# Patient Record
Sex: FEMALE | Race: WHITE | NOT HISPANIC OR LATINO | Employment: PART TIME | URBAN - METROPOLITAN AREA
[De-identification: names, ages, dates, MRNs, and addresses within clinical notes are randomized per-mention and may not be internally consistent; named-entity substitution may affect disease eponyms.]

---

## 2017-06-30 ENCOUNTER — COMMUNICATION - HEALTHEAST (OUTPATIENT)
Dept: INTERNAL MEDICINE | Facility: CLINIC | Age: 57
End: 2017-06-30

## 2017-06-30 DIAGNOSIS — I10 HTN (HYPERTENSION): ICD-10-CM

## 2017-09-12 ENCOUNTER — COMMUNICATION - HEALTHEAST (OUTPATIENT)
Dept: INTERNAL MEDICINE | Facility: CLINIC | Age: 57
End: 2017-09-12

## 2017-09-12 DIAGNOSIS — I10 HTN (HYPERTENSION): ICD-10-CM

## 2017-09-29 ENCOUNTER — RECORDS - HEALTHEAST (OUTPATIENT)
Dept: ADMINISTRATIVE | Facility: OTHER | Age: 57
End: 2017-09-29

## 2017-11-12 ENCOUNTER — COMMUNICATION - HEALTHEAST (OUTPATIENT)
Dept: INTERNAL MEDICINE | Facility: CLINIC | Age: 57
End: 2017-11-12

## 2017-11-12 ENCOUNTER — COMMUNICATION - HEALTHEAST (OUTPATIENT)
Dept: FAMILY MEDICINE | Facility: CLINIC | Age: 57
End: 2017-11-12

## 2017-11-12 DIAGNOSIS — I10 HTN (HYPERTENSION): ICD-10-CM

## 2018-01-28 ENCOUNTER — COMMUNICATION - HEALTHEAST (OUTPATIENT)
Dept: INTERNAL MEDICINE | Facility: CLINIC | Age: 58
End: 2018-01-28

## 2018-01-28 DIAGNOSIS — I10 HTN (HYPERTENSION): ICD-10-CM

## 2018-01-30 ENCOUNTER — OFFICE VISIT - HEALTHEAST (OUTPATIENT)
Dept: INTERNAL MEDICINE | Facility: CLINIC | Age: 58
End: 2018-01-30

## 2018-01-30 DIAGNOSIS — E78.00 ELEVATED CHOLESTEROL: ICD-10-CM

## 2018-01-30 DIAGNOSIS — I10 HTN (HYPERTENSION): ICD-10-CM

## 2018-01-30 LAB
ALBUMIN SERPL-MCNC: 4.1 G/DL (ref 3.5–5)
ALP SERPL-CCNC: 56 U/L (ref 45–120)
ALT SERPL W P-5'-P-CCNC: 34 U/L (ref 0–45)
ANION GAP SERPL CALCULATED.3IONS-SCNC: 11 MMOL/L (ref 5–18)
AST SERPL W P-5'-P-CCNC: 26 U/L (ref 0–40)
BILIRUB SERPL-MCNC: 0.6 MG/DL (ref 0–1)
BUN SERPL-MCNC: 20 MG/DL (ref 8–22)
CALCIUM SERPL-MCNC: 9.9 MG/DL (ref 8.5–10.5)
CHLORIDE BLD-SCNC: 102 MMOL/L (ref 98–107)
CHOLEST SERPL-MCNC: 286 MG/DL
CO2 SERPL-SCNC: 26 MMOL/L (ref 22–31)
CREAT SERPL-MCNC: 0.81 MG/DL (ref 0.6–1.1)
FASTING STATUS PATIENT QL REPORTED: ABNORMAL
GFR SERPL CREATININE-BSD FRML MDRD: >60 ML/MIN/1.73M2
GLUCOSE BLD-MCNC: 104 MG/DL (ref 70–125)
HDLC SERPL-MCNC: 47 MG/DL
LDLC SERPL CALC-MCNC: 162 MG/DL
POTASSIUM BLD-SCNC: 3.8 MMOL/L (ref 3.5–5)
PROT SERPL-MCNC: 7.3 G/DL (ref 6–8)
SODIUM SERPL-SCNC: 139 MMOL/L (ref 136–145)
TRIGL SERPL-MCNC: 387 MG/DL
TSH SERPL DL<=0.005 MIU/L-ACNC: 1.13 UIU/ML (ref 0.3–5)

## 2018-06-11 ENCOUNTER — COMMUNICATION - HEALTHEAST (OUTPATIENT)
Dept: INTERNAL MEDICINE | Facility: CLINIC | Age: 58
End: 2018-06-11

## 2018-06-18 ENCOUNTER — OFFICE VISIT - HEALTHEAST (OUTPATIENT)
Dept: INTERNAL MEDICINE | Facility: CLINIC | Age: 58
End: 2018-06-18

## 2018-06-18 DIAGNOSIS — G47.00 INSOMNIA: ICD-10-CM

## 2018-06-18 DIAGNOSIS — F32.A DEPRESSION: ICD-10-CM

## 2018-06-18 DIAGNOSIS — Z12.31 VISIT FOR SCREENING MAMMOGRAM: ICD-10-CM

## 2018-06-25 ENCOUNTER — OFFICE VISIT - HEALTHEAST (OUTPATIENT)
Dept: PSYCHOLOGY | Facility: CLINIC | Age: 58
End: 2018-06-25

## 2018-06-25 DIAGNOSIS — F43.22 ADJUSTMENT DISORDER WITH ANXIOUS MOOD: ICD-10-CM

## 2018-07-05 ENCOUNTER — COMMUNICATION - HEALTHEAST (OUTPATIENT)
Dept: ADMINISTRATIVE | Facility: CLINIC | Age: 58
End: 2018-07-05

## 2018-07-05 ENCOUNTER — COMMUNICATION - HEALTHEAST (OUTPATIENT)
Dept: INTERNAL MEDICINE | Facility: CLINIC | Age: 58
End: 2018-07-05

## 2018-11-07 ENCOUNTER — RECORDS - HEALTHEAST (OUTPATIENT)
Dept: ADMINISTRATIVE | Facility: OTHER | Age: 58
End: 2018-11-07

## 2019-01-12 ENCOUNTER — COMMUNICATION - HEALTHEAST (OUTPATIENT)
Dept: INTERNAL MEDICINE | Facility: CLINIC | Age: 59
End: 2019-01-12

## 2019-01-12 DIAGNOSIS — I10 HTN (HYPERTENSION): ICD-10-CM

## 2019-01-13 ENCOUNTER — COMMUNICATION - HEALTHEAST (OUTPATIENT)
Dept: INTERNAL MEDICINE | Facility: CLINIC | Age: 59
End: 2019-01-13

## 2019-01-13 DIAGNOSIS — I10 HTN (HYPERTENSION): ICD-10-CM

## 2019-03-19 ENCOUNTER — COMMUNICATION - HEALTHEAST (OUTPATIENT)
Dept: INTERNAL MEDICINE | Facility: CLINIC | Age: 59
End: 2019-03-19

## 2019-03-19 DIAGNOSIS — I10 HTN (HYPERTENSION): ICD-10-CM

## 2019-03-20 ENCOUNTER — HOSPITAL ENCOUNTER (OUTPATIENT)
Dept: MAMMOGRAPHY | Facility: CLINIC | Age: 59
Discharge: HOME | End: 2019-03-20
Attending: INTERNAL MEDICINE

## 2019-03-20 DIAGNOSIS — Z12.31 VISIT FOR SCREENING MAMMOGRAM: ICD-10-CM

## 2019-04-23 ENCOUNTER — COMMUNICATION - HEALTHEAST (OUTPATIENT)
Dept: FAMILY MEDICINE | Facility: CLINIC | Age: 59
End: 2019-04-23

## 2019-04-23 ENCOUNTER — OFFICE VISIT - HEALTHEAST (OUTPATIENT)
Dept: FAMILY MEDICINE | Facility: CLINIC | Age: 59
End: 2019-04-23

## 2019-04-23 DIAGNOSIS — I10 ESSENTIAL HYPERTENSION: ICD-10-CM

## 2019-04-23 DIAGNOSIS — Z12.4 SCREENING FOR CERVICAL CANCER: ICD-10-CM

## 2019-04-23 DIAGNOSIS — E78.2 MIXED HYPERLIPIDEMIA: ICD-10-CM

## 2019-04-23 DIAGNOSIS — R10.11 RUQ ABDOMINAL PAIN: ICD-10-CM

## 2019-04-23 DIAGNOSIS — Z78.0 MENOPAUSE: ICD-10-CM

## 2019-04-23 ASSESSMENT — MIFFLIN-ST. JEOR: SCORE: 1211.12

## 2019-04-23 NOTE — ASSESSMENT & PLAN NOTE
Lab Results   Component Value Date    CHOL 286 (H) 01/30/2018    CHOL 308 (H) 08/05/2016    CHOL 273 (H) 05/31/2012     Lab Results   Component Value Date    HDL 47 (L) 01/30/2018    HDL 56 08/05/2016    HDL 59 05/31/2012     Lab Results   Component Value Date    LDLCALC 162 (H) 01/30/2018    LDLCALC 207 (H) 08/05/2016    LDLCALC 153.0 (H) 05/31/2012     Lab Results   Component Value Date    TRIG 387 (H) 01/30/2018    TRIG 226 (H) 08/05/2016    TRIG 119 04/14/2015     No components found for: CHOLHDL

## 2019-04-23 NOTE — ASSESSMENT & PLAN NOTE
BP Readings from Last 3 Encounters:   04/23/19 112/62   06/18/18 150/90   01/30/18 132/72     Continue lisinopril 20 mg po q day  Continue hctz 12.5 mg po q day.   Results for orders placed or performed in visit on 01/30/18   Comprehensive Metabolic Panel   Result Value Ref Range    Sodium 139 136 - 145 mmol/L    Potassium 3.8 3.5 - 5.0 mmol/L    Chloride 102 98 - 107 mmol/L    CO2 26 - 31 mmol/L    Anion Gap, Calculation 11 5 - 18 mmol/L    Glucose 104 70 - 125 mg/dL    BUN 20 8 - 22 mg/dL    Creatinine 0.81 0.60 - 1.10 mg/dL    MDRD Af Amer >60 >60 mL/min/1.73m2    GFR MDRD Non Af Amer >60 >60 mL/min/1.73m2    Bilirubin, Total 0.6 0.0 - 1.0 mg/dL    Calcium 9.9 8.5 - 10.5 mg/dL    Protein, Total 7.3 6.0 - 8.0 g/dL    Albumin 4.1 3.5 - 5.0 g/dL    Alkaline Phosphatase 56 45 - 120 U/L    AST 26 0 - 40 U/L    ALT 34 0 - 45 U/L

## 2019-06-21 ENCOUNTER — COMMUNICATION - HEALTHEAST (OUTPATIENT)
Dept: INTERNAL MEDICINE | Facility: CLINIC | Age: 59
End: 2019-06-21

## 2019-06-21 DIAGNOSIS — I10 HTN (HYPERTENSION): ICD-10-CM

## 2019-06-25 ENCOUNTER — COMMUNICATION - HEALTHEAST (OUTPATIENT)
Dept: INTERNAL MEDICINE | Facility: CLINIC | Age: 59
End: 2019-06-25

## 2019-06-25 DIAGNOSIS — I10 HTN (HYPERTENSION): ICD-10-CM

## 2019-06-25 DIAGNOSIS — Z13.0 SCREENING, ANEMIA, DEFICIENCY, IRON: ICD-10-CM

## 2019-06-28 ENCOUNTER — RECORDS - HEALTHEAST (OUTPATIENT)
Dept: FAMILY MEDICINE | Facility: CLINIC | Age: 59
End: 2019-06-28

## 2019-06-28 ENCOUNTER — COMMUNICATION - HEALTHEAST (OUTPATIENT)
Dept: FAMILY MEDICINE | Facility: CLINIC | Age: 59
End: 2019-06-28

## 2019-08-20 ENCOUNTER — OFFICE VISIT - HEALTHEAST (OUTPATIENT)
Dept: FAMILY MEDICINE | Facility: CLINIC | Age: 59
End: 2019-08-20

## 2019-08-20 DIAGNOSIS — Z00.00 PREVENTATIVE HEALTH CARE: ICD-10-CM

## 2019-08-20 DIAGNOSIS — I10 HTN (HYPERTENSION): ICD-10-CM

## 2019-08-20 DIAGNOSIS — Z00.00 ENCOUNTER FOR PREVENTIVE CARE: ICD-10-CM

## 2019-08-20 DIAGNOSIS — Z13.0 SCREENING, ANEMIA, DEFICIENCY, IRON: ICD-10-CM

## 2019-08-20 DIAGNOSIS — R10.11 RUQ ABDOMINAL PAIN: ICD-10-CM

## 2019-08-20 DIAGNOSIS — Z78.0 MENOPAUSE: ICD-10-CM

## 2019-08-20 DIAGNOSIS — Z13.220 SCREENING, LIPID: ICD-10-CM

## 2019-08-20 DIAGNOSIS — I10 ESSENTIAL HYPERTENSION: ICD-10-CM

## 2019-08-20 DIAGNOSIS — Z11.59 ENCOUNTER FOR HEPATITIS C SCREENING TEST FOR LOW RISK PATIENT: ICD-10-CM

## 2019-08-20 DIAGNOSIS — Z12.4 SCREENING FOR MALIGNANT NEOPLASM OF CERVIX: ICD-10-CM

## 2019-08-20 DIAGNOSIS — E78.2 MIXED HYPERLIPIDEMIA: ICD-10-CM

## 2019-08-20 DIAGNOSIS — Z13.228 SCREENING FOR METABOLIC DISORDER: ICD-10-CM

## 2019-08-20 ASSESSMENT — MIFFLIN-ST. JEOR: SCORE: 1197.51

## 2019-08-20 NOTE — ASSESSMENT & PLAN NOTE
Flu shot recommended in the fall.   Pap: normal 8/24/2012 - due now.   Mammo: normal 3/20/2019  Colonoscopy: normal colonoscopy 9/2019   Std testing desired:  offered  Osteoporosis prevention discussed.  vitamin d levels ordered. Recommend daily calcium and vitamin d intake to keep good bone health. Recommend weight bearing exercise, no tobacco, and limit alcohol  dexa - ordered recently.  Recommend sunscreen, exercise, & healthy diet.  Offered tsh, glucose, hgb, lipid  I have had an Advance Directives discussion with thepatient.   Body mass index is 26.64 kg/m .   mychart active.

## 2019-08-20 NOTE — ASSESSMENT & PLAN NOTE
BP Readings from Last 3 Encounters:   08/20/19 128/74   04/23/19 112/62   06/18/18 150/90     Well controlled -   Continue hctz 12.5 mg po q day  Continue lisinopril 20 mgp oq day  Ck cmp to monitor drugs/renal

## 2019-08-23 LAB
BKR LAB AP ABNORMAL BLEEDING: NO
BKR LAB AP BIRTH CONTROL/HORMONES: NORMAL
BKR LAB AP CERVICAL APPEARANCE: NORMAL
BKR LAB AP GYN ADEQUACY: NORMAL
BKR LAB AP GYN INTERPRETATION: NORMAL
BKR LAB AP HPV REFLEX: NORMAL
BKR LAB AP LMP: NORMAL
BKR LAB AP PATIENT STATUS: NORMAL
BKR LAB AP PREVIOUS ABNORMAL: NORMAL
BKR LAB AP PREVIOUS NORMAL: 2012
HIGH RISK?: NO
PATH REPORT.COMMENTS IMP SPEC: NORMAL
RESULT FLAG (HE HISTORICAL CONVERSION): NORMAL

## 2019-09-04 ENCOUNTER — AMBULATORY - HEALTHEAST (OUTPATIENT)
Dept: LAB | Facility: CLINIC | Age: 59
End: 2019-09-04

## 2019-09-04 DIAGNOSIS — Z13.228 SCREENING FOR METABOLIC DISORDER: ICD-10-CM

## 2019-09-04 DIAGNOSIS — Z13.0 SCREENING, ANEMIA, DEFICIENCY, IRON: ICD-10-CM

## 2019-09-04 DIAGNOSIS — Z13.220 SCREENING, LIPID: ICD-10-CM

## 2019-09-04 DIAGNOSIS — Z11.59 ENCOUNTER FOR HEPATITIS C SCREENING TEST FOR LOW RISK PATIENT: ICD-10-CM

## 2019-09-04 LAB
ALBUMIN SERPL-MCNC: 4.4 G/DL (ref 3.5–5)
ALP SERPL-CCNC: 65 U/L (ref 45–120)
ALT SERPL W P-5'-P-CCNC: 23 U/L (ref 0–45)
ANION GAP SERPL CALCULATED.3IONS-SCNC: 11 MMOL/L (ref 5–18)
AST SERPL W P-5'-P-CCNC: 23 U/L (ref 0–40)
BASOPHILS # BLD AUTO: 0.1 THOU/UL (ref 0–0.2)
BASOPHILS NFR BLD AUTO: 1 % (ref 0–2)
BILIRUB SERPL-MCNC: 1 MG/DL (ref 0–1)
BUN SERPL-MCNC: 14 MG/DL (ref 8–22)
CALCIUM SERPL-MCNC: 10.5 MG/DL (ref 8.5–10.5)
CHLORIDE BLD-SCNC: 103 MMOL/L (ref 98–107)
CHOLEST SERPL-MCNC: 296 MG/DL
CO2 SERPL-SCNC: 25 MMOL/L (ref 22–31)
CREAT SERPL-MCNC: 0.77 MG/DL (ref 0.6–1.1)
EOSINOPHIL # BLD AUTO: 0.4 THOU/UL (ref 0–0.4)
EOSINOPHIL NFR BLD AUTO: 5 % (ref 0–6)
ERYTHROCYTE [DISTWIDTH] IN BLOOD BY AUTOMATED COUNT: 10.8 % (ref 11–14.5)
FASTING STATUS PATIENT QL REPORTED: YES
GFR SERPL CREATININE-BSD FRML MDRD: >60 ML/MIN/1.73M2
GLUCOSE BLD-MCNC: 110 MG/DL (ref 70–125)
HCT VFR BLD AUTO: 43.4 % (ref 35–47)
HDLC SERPL-MCNC: 70 MG/DL
HGB BLD-MCNC: 14.9 G/DL (ref 12–16)
LDLC SERPL CALC-MCNC: 175 MG/DL
LYMPHOCYTES # BLD AUTO: 2.1 THOU/UL (ref 0.8–4.4)
LYMPHOCYTES NFR BLD AUTO: 26 % (ref 20–40)
MCH RBC QN AUTO: 32.8 PG (ref 27–34)
MCHC RBC AUTO-ENTMCNC: 34.4 G/DL (ref 32–36)
MCV RBC AUTO: 95 FL (ref 80–100)
MONOCYTES # BLD AUTO: 0.7 THOU/UL (ref 0–0.9)
MONOCYTES NFR BLD AUTO: 8 % (ref 2–10)
NEUTROPHILS # BLD AUTO: 4.8 THOU/UL (ref 2–7.7)
NEUTROPHILS NFR BLD AUTO: 60 % (ref 50–70)
PLATELET # BLD AUTO: 252 THOU/UL (ref 140–440)
PMV BLD AUTO: 8.1 FL (ref 7–10)
POTASSIUM BLD-SCNC: 4.2 MMOL/L (ref 3.5–5)
PROT SERPL-MCNC: 7.3 G/DL (ref 6–8)
RBC # BLD AUTO: 4.56 MILL/UL (ref 3.8–5.4)
SODIUM SERPL-SCNC: 139 MMOL/L (ref 136–145)
TRIGL SERPL-MCNC: 256 MG/DL
WBC: 8 THOU/UL (ref 4–11)

## 2019-09-05 LAB — HCV AB SERPL QL IA: NEGATIVE

## 2020-05-05 ENCOUNTER — COMMUNICATION - HEALTHEAST (OUTPATIENT)
Dept: FAMILY MEDICINE | Facility: CLINIC | Age: 60
End: 2020-05-05

## 2020-08-18 ENCOUNTER — COMMUNICATION - HEALTHEAST (OUTPATIENT)
Dept: FAMILY MEDICINE | Facility: CLINIC | Age: 60
End: 2020-08-18

## 2020-08-18 DIAGNOSIS — I10 HTN (HYPERTENSION): ICD-10-CM

## 2020-08-20 ENCOUNTER — COMMUNICATION - HEALTHEAST (OUTPATIENT)
Dept: FAMILY MEDICINE | Facility: CLINIC | Age: 60
End: 2020-08-20

## 2020-09-15 ENCOUNTER — OFFICE VISIT - HEALTHEAST (OUTPATIENT)
Dept: FAMILY MEDICINE | Facility: CLINIC | Age: 60
End: 2020-09-15

## 2020-09-15 DIAGNOSIS — I10 HTN (HYPERTENSION): ICD-10-CM

## 2020-09-15 DIAGNOSIS — Z00.00 PREVENTATIVE HEALTH CARE: ICD-10-CM

## 2020-09-15 DIAGNOSIS — Z13.0 SCREENING, ANEMIA, DEFICIENCY, IRON: ICD-10-CM

## 2020-09-15 DIAGNOSIS — E78.2 MIXED HYPERLIPIDEMIA: ICD-10-CM

## 2020-09-15 DIAGNOSIS — Z12.31 ENCOUNTER FOR SCREENING MAMMOGRAM FOR MALIGNANT NEOPLASM OF BREAST: ICD-10-CM

## 2020-09-15 DIAGNOSIS — Z78.0 MENOPAUSE: ICD-10-CM

## 2020-09-15 DIAGNOSIS — I10 ESSENTIAL HYPERTENSION: ICD-10-CM

## 2020-09-15 NOTE — ASSESSMENT & PLAN NOTE
Repeat. Statin decision aid reviewed. Her 10 yr risk could drop from 5% to 3% with statin. She will recheck labs now and decide after that.

## 2020-09-15 NOTE — ASSESSMENT & PLAN NOTE
Flu shot?  Will get at work.   Pap: 8/2022  Mammo: nl3/2019  Colonoscopy: normal 9/2017 - repeat 9/2027  Std testing desired:  offered  Osteoporosis prevention discussed.  vitamin d levels ordered. Recommend daily calcium and vitamin d intake to keep good bone health. Recommend weight bearing exercise, no tobacco, and limit alcohol  dexa - ordered today.   Recommend sunscreen, exercise, & healthy diet.  Offered cbc, cmp, lipidsand asked what other testing she  desires today  I have had an Advance Directives discussion with the patient.   Body mass index is 25.92 kg/m .   [facility] active.

## 2020-09-15 NOTE — ASSESSMENT & PLAN NOTE
BP Readings from Last 3 Encounters:   09/15/20 132/76   08/20/19 128/74   04/23/19 112/62     Lisinopril 20mg po q day  hctz 12.5 mg po q day  cmp pending  ekg noted on file 2016 - repeat q 5 yrs for baseline  Follow up in 1 year if labs are normal.

## 2020-10-14 ENCOUNTER — AMBULATORY - HEALTHEAST (OUTPATIENT)
Dept: LAB | Facility: CLINIC | Age: 60
End: 2020-10-14

## 2020-10-14 DIAGNOSIS — E78.2 MIXED HYPERLIPIDEMIA: ICD-10-CM

## 2020-10-14 DIAGNOSIS — I10 ESSENTIAL HYPERTENSION: ICD-10-CM

## 2020-10-14 DIAGNOSIS — Z13.0 SCREENING, ANEMIA, DEFICIENCY, IRON: ICD-10-CM

## 2020-10-14 LAB
ALBUMIN SERPL-MCNC: 4.2 G/DL (ref 3.5–5)
ALP SERPL-CCNC: 62 U/L (ref 45–120)
ALT SERPL W P-5'-P-CCNC: 21 U/L (ref 0–45)
ANION GAP SERPL CALCULATED.3IONS-SCNC: 8 MMOL/L (ref 5–18)
AST SERPL W P-5'-P-CCNC: 21 U/L (ref 0–40)
BASOPHILS # BLD AUTO: 0.1 THOU/UL (ref 0–0.2)
BASOPHILS NFR BLD AUTO: 1 % (ref 0–2)
BILIRUB SERPL-MCNC: 0.6 MG/DL (ref 0–1)
BUN SERPL-MCNC: 12 MG/DL (ref 8–22)
CALCIUM SERPL-MCNC: 9.8 MG/DL (ref 8.5–10.5)
CHLORIDE BLD-SCNC: 104 MMOL/L (ref 98–107)
CHOLEST SERPL-MCNC: 273 MG/DL
CO2 SERPL-SCNC: 27 MMOL/L (ref 22–31)
CREAT SERPL-MCNC: 0.81 MG/DL (ref 0.6–1.1)
EOSINOPHIL # BLD AUTO: 0.2 THOU/UL (ref 0–0.4)
EOSINOPHIL NFR BLD AUTO: 4 % (ref 0–6)
ERYTHROCYTE [DISTWIDTH] IN BLOOD BY AUTOMATED COUNT: 11.8 % (ref 11–14.5)
FASTING STATUS PATIENT QL REPORTED: YES
GFR SERPL CREATININE-BSD FRML MDRD: >60 ML/MIN/1.73M2
GLUCOSE BLD-MCNC: 99 MG/DL (ref 70–125)
HCT VFR BLD AUTO: 42.1 % (ref 35–47)
HDLC SERPL-MCNC: 60 MG/DL
HGB BLD-MCNC: 14.4 G/DL (ref 12–16)
IMM GRANULOCYTES # BLD: 0 THOU/UL
IMM GRANULOCYTES NFR BLD: 0 %
LDLC SERPL CALC-MCNC: 164 MG/DL
LYMPHOCYTES # BLD AUTO: 1.4 THOU/UL (ref 0.8–4.4)
LYMPHOCYTES NFR BLD AUTO: 32 % (ref 20–40)
MCH RBC QN AUTO: 31.4 PG (ref 27–34)
MCHC RBC AUTO-ENTMCNC: 34.2 G/DL (ref 32–36)
MCV RBC AUTO: 92 FL (ref 80–100)
MONOCYTES # BLD AUTO: 0.5 THOU/UL (ref 0–0.9)
MONOCYTES NFR BLD AUTO: 11 % (ref 2–10)
NEUTROPHILS # BLD AUTO: 2.4 THOU/UL (ref 2–7.7)
NEUTROPHILS NFR BLD AUTO: 52 % (ref 50–70)
PLATELET # BLD AUTO: 236 THOU/UL (ref 140–440)
PMV BLD AUTO: 10.5 FL (ref 8.5–12.5)
POTASSIUM BLD-SCNC: 4.1 MMOL/L (ref 3.5–5)
PROT SERPL-MCNC: 7.4 G/DL (ref 6–8)
RBC # BLD AUTO: 4.59 MILL/UL (ref 3.8–5.4)
SODIUM SERPL-SCNC: 139 MMOL/L (ref 136–145)
TRIGL SERPL-MCNC: 244 MG/DL
WBC: 4.5 THOU/UL (ref 4–11)

## 2020-11-07 ENCOUNTER — COMMUNICATION - HEALTHEAST (OUTPATIENT)
Dept: FAMILY MEDICINE | Facility: CLINIC | Age: 60
End: 2020-11-07

## 2020-11-07 DIAGNOSIS — I10 HTN (HYPERTENSION): ICD-10-CM

## 2020-11-08 RX ORDER — LISINOPRIL 20 MG/1
20 TABLET ORAL DAILY
Qty: 90 TABLET | Refills: 2 | Status: SHIPPED | OUTPATIENT
Start: 2020-11-08 | End: 2021-08-03

## 2020-11-25 ENCOUNTER — COMMUNICATION - HEALTHEAST (OUTPATIENT)
Dept: FAMILY MEDICINE | Facility: CLINIC | Age: 60
End: 2020-11-25

## 2020-11-25 DIAGNOSIS — I10 HTN (HYPERTENSION): ICD-10-CM

## 2020-11-26 RX ORDER — HYDROCHLOROTHIAZIDE 12.5 MG/1
12.5 CAPSULE ORAL DAILY
Qty: 90 CAPSULE | Refills: 2 | Status: SHIPPED | OUTPATIENT
Start: 2020-11-26 | End: 2021-08-13

## 2020-12-03 ENCOUNTER — HOSPITAL ENCOUNTER (OUTPATIENT)
Dept: MAMMOGRAPHY | Facility: CLINIC | Age: 60
Discharge: HOME | End: 2020-12-03
Attending: FAMILY MEDICINE

## 2020-12-03 DIAGNOSIS — Z12.31 ENCOUNTER FOR SCREENING MAMMOGRAM FOR MALIGNANT NEOPLASM OF BREAST: ICD-10-CM

## 2020-12-07 ENCOUNTER — VIRTUAL VISIT (OUTPATIENT)
Dept: FAMILY MEDICINE | Facility: OTHER | Age: 60
End: 2020-12-07

## 2020-12-07 NOTE — PROGRESS NOTES
"Date: 2020 08:39:51  Clinician: Suellen Hoffmann  Clinician NPI: [id number]  Patient: Maral Marino  Patient : 1960  Patient Address: [address] [place] [address]  Patient Phone: [phone]  Visit Protocol: URI  Patient Summary:  Maral is a 60 year old ( : 1960 ) female who initiated a [facility] Visit for COVID-19 (Coronavirus) evaluation and screening. When asked the question \"Please sign me up to receive news, health information and promotions from [facility].\", Maral responded \"Yes\".    When asked when her symptoms started, Maral reported that she does not have any symptoms.   She denies taking antibiotic medication in the past month and having recent facial or sinus surgery in the past 60 days.    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 She provides direct patient care. In the past 14 days, Maral has worked or volunteered at a hospital or a clinic. 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   Maral has had a close contact with a laboratory-confirmed COVID-19 patient in the last 14 days. She does not know when she was exposed to the laboratory-confirmed COVID-19 patient.   Additional information about contact with COVID-19 (Coronavirus) patient as reported by the patient (free text): I do not know where I was exposed or my . My  started having symptoms two days ago and was tested for covid that day . We just found out he is positive. The only symptom if any is a head ache   Maral is living in the same household with the COVID-19 positive patient. She was in an enclosed space for greater than 15 minutes with the COVID-19 patient.   During the encounter, neither were wearing masks.   Since 2019, Maral has not been tested for COVID-19 and has not " had upper respiratory infection or influenza-like illness.   Pertinent medical history  She has not been told by her provider to avoid NSAIDs.   Maral does not get yeast infections when she takes antibiotics.   Maral does not have diabetes. She denies having immunosuppressive conditions (e.g., chemotherapy, HIV, organ transplant, long-term use of steroids or other immunosuppressive medications, splenectomy). She does not have severe COPD and congestive heart failure. She does not have asthma.   Maral needs a return to work/school note.   Weight: 144 lbs   Maral does not smoke or use smokeless tobacco.   Weight: 144 lbs    MEDICATIONS: lisinopril-hydrochlorothiazide oral, ALLERGIES: NKDA  Clinician Response:  Dear Maral,   Your symptoms show that you may have coronavirus (COVID-19). This illness can cause fever, cough and trouble breathing. Many people get a mild case and get better on their own. Some people can get very sick.  What should I do?  We would like to test you for this virus.   1. Please call [phone] to schedule your visit. Explain that you were referred by [facility] to have a COVID-19 test. Be ready to share your [facility] visit ID number.  * If you need to schedule in [facility] please call [phone] or for [employer] [place] employees please call [phone].  * If you need to schedule in the [place] area please call [phone]. [place] employees call [phone].  The following will serve as your written order for this COVID Test, ordered by me, for the indication of suspected COVID [Z20.828]: The test will be ordered in [employer] [facility], our electronic health record, after you are scheduled. It will show as ordered and authorized by Nabil Colindres MD.  Order: COVID-19 (Coronavirus) PCR for SYMPTOMATIC testing from [facility].   2. When it's time for your COVID test:  Stay at least 6 feet away from others. (If someone will drive you to your test, stay in the backseat, as far away from the  as you can.)   Cover your mouth  "and nose with a mask, tissue or washcloth.  Go straight to the testing site. Don't make any stops on the way there or back.      3.Starting now: Stay home and away from others (self-isolate) until:   You've had no fever---and no medicine that reduces fever---for one full day (24 hours). And...   Your other symptoms have gotten better. For example, your cough or breathing has improved. And...   At least 10 days have passed since your symptoms started.       During this time, don't leave the house except for testing or medical care.   Stay in your own room, even for meals. Use your own bathroom if you can.   Stay away from others in your home. No hugging, kissing or shaking hands. No visitors.  Don't go to work, school or anywhere else.    Clean \"high touch\" surfaces often (doorknobs, counters, handles, etc.). Use a household cleaning spray or wipes. You'll find a full list of  on the EPA website: www.epa.gov/pesticide-registration/list-n-disinfectants-use-against-sars-cov-2.   Cover your mouth and nose with a mask, tissue or washcloth to avoid spreading germs.  Wash your hands and face often. Use soap and water.  Caregivers in these groups are at risk for severe illness due to COVID-19:  o People 65 years and older  o People who live in a nursing home or long-term care facility  o People with chronic disease (lung, heart, cancer, diabetes, kidney, liver, immunologic)  o People who have a weakened immune system, including those who:   Are in cancer treatment  Take medicine that weakens the immune system, such as corticosteroids  Had a bone marrow or organ transplant  Have an immune deficiency  Have poorly controlled HIV or AIDS  Are obese (body mass index of 40 or higher)  Smoke regularly   o Caregivers should wear gloves while washing dishes, handling laundry and cleaning bedrooms and bathrooms.  o Use caution when washing and drying laundry: Don't shake dirty laundry, and use the warmest water setting that " you can.  o For more tips, go to www.cdc.gov/coronavirus/2019-ncov/downloads/10Things.pdf.    4.Sign up for [employer]. We know it's scary to hear that you might have COVID-19. We want to track your symptoms to make sure you're okay over the next 2 weeks. Please look for an email from [employer]---this is a free, online program that we'll use to keep in touch. To sign up, follow the link in the email. Learn more at [email or web address]  How can I take care of myself?   Get lots of rest. Drink extra fluids (unless a doctor has told you not to).   Take Tylenol (acetaminophen) for fever or pain. If you have liver or kidney problems, ask your family doctor if it's okay to take Tylenol.   Adults can take either:    650 mg (two 325 mg pills) every 4 to 6 hours, or...   1,000 mg (two 500 mg pills) every 8 hours as needed.    Note: Don't take more than 3,000 mg in one day. Acetaminophen is found in many medicines (both prescribed and over-the-counter medicines). Read all labels to be sure you don't take too much.   For children, check the Tylenol bottle for the right dose. The dose is based on the child's age or weight.    If you have other health problems (like cancer, heart failure, an organ transplant or severe kidney disease): Call your specialty clinic if you don't feel better in the next 2 days.       Know when to call 911. Emergency warning signs include:    Trouble breathing or shortness of breath Pain or pressure in the chest that doesn't go away Feeling confused like you haven't felt before, or not being able to wake up Bluish-colored lips or face.  Where can I get more information?    [facility] -- About COVID-19: [email or web address]   CDC -- What to Do If You're Sick: www.cdc.gov/coronavirus/2019-ncov/about/steps-when-sick.html   [facility] -- Ending Home Isolation: www.cdc.gov/coronavirus/2019-ncov/hcp/disposition-in-home-patients.html   [facility] -- Caring for Someone:  www.cdc.gov/coronavirus/2019-ncov/if-you-are-sick/care-for-someone.html   [facility] -- Interim Guidance for Hospital Discharge to Home: www.health.[facility].[place].us/diseases/coronavirus/hcp/hospdischarge.pdf   [facility] clinical trials (COVID-19 research studies): clinicalaffairs.[facility]-clinical-trials    Below are the COVID-19 hotlines at the [place] Department of Health ([facility]). Interpreters are available.    For health questions: Call 026-731-0606 or 1-716.838.9576 (7 a.m. to 7 p.m.) For questions about schools and childcare: Call 984-530-7728 or 1-181.453.7612 (7 a.m. to 7 p.m.)    Diagnosis: Tension-type headache, unspecified, intractable  Diagnosis ICD: G44.201

## 2020-12-08 ENCOUNTER — RECORDS - HEALTHEAST (OUTPATIENT)
Dept: LAB | Facility: CLINIC | Age: 60
End: 2020-12-08

## 2020-12-08 LAB
SARS-COV-2 PCR COMMENT: NORMAL
SARS-COV-2 RNA SPEC QL NAA+PROBE: NEGATIVE
SARS-COV-2 VIRUS SPECIMEN SOURCE: NORMAL

## 2021-01-03 ENCOUNTER — RECORDS - HEALTHEAST (OUTPATIENT)
Dept: ADMINISTRATIVE | Facility: OTHER | Age: 61
End: 2021-01-03

## 2021-01-04 ENCOUNTER — RECORDS - HEALTHEAST (OUTPATIENT)
Dept: ADMINISTRATIVE | Facility: OTHER | Age: 61
End: 2021-01-04

## 2021-01-13 ENCOUNTER — RECORDS - HEALTHEAST (OUTPATIENT)
Dept: ADMINISTRATIVE | Facility: OTHER | Age: 61
End: 2021-01-13

## 2021-01-27 ENCOUNTER — RECORDS - HEALTHEAST (OUTPATIENT)
Dept: LAB | Facility: CLINIC | Age: 61
End: 2021-01-27

## 2021-01-27 LAB
25(OH)D3 SERPL-MCNC: 36.8 NG/ML (ref 30–80)
CALCIUM SERPL-MCNC: 9.6 MG/DL (ref 8.5–10.5)

## 2021-02-04 ENCOUNTER — RECORDS - HEALTHEAST (OUTPATIENT)
Dept: ADMINISTRATIVE | Facility: OTHER | Age: 61
End: 2021-02-04

## 2021-02-24 ENCOUNTER — RECORDS - HEALTHEAST (OUTPATIENT)
Dept: ADMINISTRATIVE | Facility: OTHER | Age: 61
End: 2021-02-24

## 2021-03-12 ENCOUNTER — RECORDS - HEALTHEAST (OUTPATIENT)
Dept: BONE DENSITY | Facility: CLINIC | Age: 61
End: 2021-03-12

## 2021-03-12 ENCOUNTER — RECORDS - HEALTHEAST (OUTPATIENT)
Dept: ADMINISTRATIVE | Facility: OTHER | Age: 61
End: 2021-03-12

## 2021-03-12 DIAGNOSIS — S62.101A FRACTURE OF UNSPECIFIED CARPAL BONE, RIGHT WRIST, INITIAL ENCOUNTER FOR CLOSED FRACTURE: ICD-10-CM

## 2021-03-16 ENCOUNTER — COMMUNICATION - HEALTHEAST (OUTPATIENT)
Dept: FAMILY MEDICINE | Facility: CLINIC | Age: 61
End: 2021-03-16

## 2021-03-29 ENCOUNTER — RECORDS - HEALTHEAST (OUTPATIENT)
Dept: ADMINISTRATIVE | Facility: OTHER | Age: 61
End: 2021-03-29

## 2021-05-28 ENCOUNTER — RECORDS - HEALTHEAST (OUTPATIENT)
Dept: ADMINISTRATIVE | Facility: CLINIC | Age: 61
End: 2021-05-28

## 2021-05-28 NOTE — PATIENT INSTRUCTIONS - HE
Return in about 3 months (around 7/23/2019) for lipid recheck. and one year for annual exam/ med refills..

## 2021-05-28 NOTE — PROGRESS NOTES
ASSESSMENT AND PLAN:   Patient is new to me today. Previously seen by Kirstie Thompson MD.    Problem List Items Addressed This Visit        Unprioritized    Mixed hyperlipidemia (Chronic)     Lab Results   Component Value Date    CHOL 286 (H) 01/30/2018    CHOL 308 (H) 08/05/2016    CHOL 273 (H) 05/31/2012     Lab Results   Component Value Date    HDL 47 (L) 01/30/2018    HDL 56 08/05/2016    HDL 59 05/31/2012     Lab Results   Component Value Date    LDLCALC 162 (H) 01/30/2018    LDLCALC 207 (H) 08/05/2016    LDLCALC 153.0 (H) 05/31/2012     Lab Results   Component Value Date    TRIG 387 (H) 01/30/2018    TRIG 226 (H) 08/05/2016    TRIG 119 04/14/2015     No components found for: CHOLHDL           Relevant Orders    Lipid Cascade    Hypertension (Chronic)     BP Readings from Last 3 Encounters:   04/23/19 112/62   06/18/18 150/90   01/30/18 132/72     Continue lisinopril 20 mg po q day  Continue hctz 12.5 mg po q day.   Results for orders placed or performed in visit on 01/30/18   Comprehensive Metabolic Panel   Result Value Ref Range    Sodium 139 136 - 145 mmol/L    Potassium 3.8 3.5 - 5.0 mmol/L    Chloride 102 98 - 107 mmol/L    CO2 26 22 - 31 mmol/L    Anion Gap, Calculation 11 5 - 18 mmol/L    Glucose 104 70 - 125 mg/dL    BUN 20 8 - 22 mg/dL    Creatinine 0.81 0.60 - 1.10 mg/dL    GFR MDRD Af Amer >60 >60 mL/min/1.73m2    GFR MDRD Non Af Amer >60 >60 mL/min/1.73m2    Bilirubin, Total 0.6 0.0 - 1.0 mg/dL    Calcium 9.9 8.5 - 10.5 mg/dL    Protein, Total 7.3 6.0 - 8.0 g/dL    Albumin 4.1 3.5 - 5.0 g/dL    Alkaline Phosphatase 56 45 - 120 U/L    AST 26 0 - 40 U/L    ALT 34 0 - 45 U/L             Menopause     Will order dexa for screening.         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  Her primary care physician-Dr. Kirstie Thompson is leaving her practice and she is forced to find a new primary care physician.    She tells me that she is generally healthy but has suffered from menorrhagia in the past and then had an IUD until menopause and now she is in menopause and no longer has an IUD and also has not had a period.  She sees a gynecologist and had a Pap about 2 years ago.    She had some problems with insomnia in the past but has used essential oils, meditation and is doing okay now.  She is also been prescribed trazodone but did not like that did not feel it worked.    She suffered from intermittent dull ache which is been going on and off in her right upper quadrant for the past year.  She says it is intermittent and unpredictable.  It has no association with when she has meals or with bowel movements.    She does not think she is ever had a DEXA scan.    Social History     Tobacco Use   Smoking Status Never Smoker   Smokeless Tobacco Never Used      Current Outpatient Medications on File Prior to Visit   Medication Sig Dispense Refill     aspirin 81 mg chewable tablet Chew 81 mg daily.       cholecalciferol, vitamin D3, (VITAMIN D3) 2,000 unit cap Take 1 capsule by mouth daily.       docoshexanoic acid-eicosapent 500 mg (FISH OIL) 500-100 mg cap capsule Take 500 mg by mouth.       hydroCHLOROthiazide (MICROZIDE) 12.5 mg capsule Take 1 capsule (12.5 mg total) by mouth daily. 90 capsule 1     Lactobacillus rhamnosus GG (CULTURELLE) 10-15 Billion cell capsule Take 1 capsule by mouth daily.       lisinopril (PRINIVIL,ZESTRIL) 20 MG tablet TAKE 1 TABLET BY MOUTH EVERY DAY 90 tablet 0     multivitamin with minerals (THERA-M) 9 mg iron-400 mcg Tab tablet Take 1 tablet by mouth daily.       [DISCONTINUED] traZODone (DESYREL) 50 MG tablet Take 1 tablet (50 mg total) by mouth at bedtime. 90 tablet 1     No current  "facility-administered medications on file prior to visit.       Allergies   Allergen Reactions     Wellbutrin [Bupropion Hcl]      Seizure       Review of Systems   Constitutional: Negative.    HENT: Negative.    Eyes: Negative.    Respiratory: Negative.    Cardiovascular: Negative.    Gastrointestinal: Negative.    Endocrine: Negative.    Genitourinary: Negative.    Musculoskeletal: Negative.    Skin: Negative.    Neurological: Negative.    Hematological: Negative.    Psychiatric/Behavioral: Negative.         OBJECTIVE: /62 (Patient Site: Left Arm, Patient Position: Sitting, Cuff Size: Adult Regular)   Pulse 74   Temp 97.5  F (36.4  C) (Oral)   Resp 18   Ht 5' 2.5\" (1.588 m) Comment: with shoes  Wt 151 lb (68.5 kg)   SpO2 99% Comment: room air  Breastfeeding? No   BMI 27.18 kg/m     Physical Exam   Constitutional: She is oriented to person, place, and time. She appears well-developed and well-nourished. No distress.   HENT:   Head: Normocephalic and atraumatic.   Eyes: Conjunctivae are normal.   Neck: Neck supple.   Cardiovascular: Normal rate and regular rhythm.   Pulmonary/Chest: Effort normal.   Abdominal: Soft. There is no tenderness.   Musculoskeletal: Normal range of motion.   Neurological: She is alert and oriented to person, place, and time.   Skin: Skin is warm and dry.   Psychiatric: She has a normal mood and affect.        This note was created using Dragon dictation.  Please excuse any grammatical errors.   "

## 2021-05-29 ENCOUNTER — RECORDS - HEALTHEAST (OUTPATIENT)
Dept: ADMINISTRATIVE | Facility: CLINIC | Age: 61
End: 2021-05-29

## 2021-05-29 NOTE — TELEPHONE ENCOUNTER
RN cannot approve Refill Request    RN can NOT refill this medication overdue for office visits and/or labs.    Jc Jaimes, [facility] Triage/Med Refill 6/21/2019    Requested Prescriptions   Pending Prescriptions Disp Refills     lisinopril (PRINIVIL,ZESTRIL) 20 MG tablet [Pharmacy Med Name: LISINOPRIL 20MG TABLETS] 90 tablet 0     Sig: TAKE 1 TABLET BY MOUTH EVERY DAY       Ace Inhibitors Refill Protocol Failed - 6/21/2019  2:14 PM        Failed - Serum Potassium in past 12 months     No results found for: LN-POTASSIUM          Failed - Serum Creatinine in past 12 months     Creatinine   Date Value Ref Range Status   01/30/2018 0.81 0.60 - 1.10 mg/dL Final             Passed - PCP or prescribing provider visit in past 12 months       Last office visit with prescriber/PCP: Visit date not found OR same dept: Visit date not found OR same specialty: 6/18/2018 Easton Landers CNP  Last physical: Visit date not found Last MTM visit: Visit date not found   Next visit within 3 mo: Visit date not found  Next physical within 3 mo: Visit date not found  Prescriber OR PCP: Zain Henderson MD  Last diagnosis associated with med order: 1. HTN (hypertension)  - lisinopril (PRINIVIL,ZESTRIL) 20 MG tablet [Pharmacy Med Name: LISINOPRIL 20MG TABLETS]; TAKE 1 TABLET BY MOUTH EVERY DAY  Dispense: 90 tablet; Refill: 0    If protocol passes may refill for 12 months if within 3 months of last provider visit (or a total of 15 months).             Passed - Blood pressure filed in past 12 months     BP Readings from Last 1 Encounters:   04/23/19 112/62

## 2021-05-30 ENCOUNTER — RECORDS - HEALTHEAST (OUTPATIENT)
Dept: ADMINISTRATIVE | Facility: CLINIC | Age: 61
End: 2021-05-30

## 2021-05-30 NOTE — TELEPHONE ENCOUNTER
Labs due, cannot refill until labs done. Please schedule, future orders are in place for cbc, cmp

## 2021-05-30 NOTE — TELEPHONE ENCOUNTER
RN cannot approve Refill Request    RN can NOT refill this medication Protocol failed and NO refill given       Ysabel Forbes, [facility] Triage/Med Refill 6/26/2019    Requested Prescriptions   Pending Prescriptions Disp Refills     hydroCHLOROthiazide (MICROZIDE) 12.5 mg capsule [Pharmacy Med Name: HYDROCHLOROTHIAZIDE 12.5MG CAPSULES] 90 capsule 0     Sig: TAKE 1 CAPSULE(12.5 MG) BY MOUTH DAILY       Diuretics/Combination Diuretics Refill Protocol  Failed - 6/25/2019 10:59 AM        Failed - Serum Potassium in past 12 months      No results found for: LN-POTASSIUM          Failed - Serum Sodium in past 12 months      No results found for: LN-SODIUM          Failed - Serum Creatinine in past 12 months      Creatinine   Date Value Ref Range Status   01/30/2018 0.81 0.60 - 1.10 mg/dL Final             Passed - Visit with PCP or prescribing provider visit in past 12 months     Last office visit with prescriber/PCP: 1/30/2018 Kirstie Thompson MD OR same dept: Visit date not found OR same specialty: 6/18/2018 Easton Landers CNP  Last physical: Visit date not found Last MTM visit: Visit date not found   Next visit within 3 mo: Visit date not found  Next physical within 3 mo: Visit date not found  Prescriber OR PCP: Kirstie Thompson MD  Last diagnosis associated with med order: 1. HTN (hypertension)  - hydroCHLOROthiazide (MICROZIDE) 12.5 mg capsule [Pharmacy Med Name: HYDROCHLOROTHIAZIDE 12.5MG CAPSULES]; TAKE 1 CAPSULE(12.5 MG) BY MOUTH DAILY  Dispense: 90 capsule; Refill: 0    If protocol passes may refill for 12 months if within 3 months of last provider visit (or a total of 15 months).             Passed - Blood pressure on file in past 12 months     BP Readings from Last 1 Encounters:   04/23/19 112/62

## 2021-06-01 ENCOUNTER — RECORDS - HEALTHEAST (OUTPATIENT)
Dept: ADMINISTRATIVE | Facility: CLINIC | Age: 61
End: 2021-06-01

## 2021-06-01 VITALS — BODY MASS INDEX: 27.71 KG/M2 | WEIGHT: 151.5 LBS

## 2021-06-01 VITALS — WEIGHT: 151.8 LBS | BODY MASS INDEX: 27.76 KG/M2

## 2021-06-02 ENCOUNTER — RECORDS - HEALTHEAST (OUTPATIENT)
Dept: ADMINISTRATIVE | Facility: CLINIC | Age: 61
End: 2021-06-02

## 2021-06-03 VITALS — BODY MASS INDEX: 26.22 KG/M2 | WEIGHT: 148 LBS | HEIGHT: 63 IN

## 2021-06-03 VITALS — BODY MASS INDEX: 26.75 KG/M2 | WEIGHT: 151 LBS | HEIGHT: 63 IN

## 2021-06-05 VITALS
DIASTOLIC BLOOD PRESSURE: 76 MMHG | WEIGHT: 144 LBS | BODY MASS INDEX: 25.92 KG/M2 | SYSTOLIC BLOOD PRESSURE: 132 MMHG | HEART RATE: 68 BPM | OXYGEN SATURATION: 98 %

## 2021-06-07 NOTE — TELEPHONE ENCOUNTER
Left message to call back for: Maral   Information to relay to patient:  We are reaching out to all of the former weight loss program patients to see if they are interested in re-starting the program.  We are hearing that a lot of people are struggling right now with the current situation (working from home, gyms are closed, etc) and we are offering to restart anyone in the program who would like.     We are doing these as virtual video visits if they are interested.        Jess Estevez CMA 5/5/2020 3:43 PM   Yue [place]

## 2021-06-10 NOTE — TELEPHONE ENCOUNTER
Due for labs    Rx renewed per Medication Renewal Policy. Medication was last renewed on 8/20/19.    Ysabel Forbes, [facility] Triage/Med Refill 8/18/2020     Requested Prescriptions   Pending Prescriptions Disp Refills     hydroCHLOROthiazide (MICROZIDE) 12.5 mg capsule 90 capsule 3     Sig: Take 1 capsule (12.5 mg total) by mouth daily.       Diuretics/Combination Diuretics Refill Protocol  Passed - 8/18/2020 11:41 AM        Passed - Visit with PCP or prescribing provider visit in past 12 months     Last office visit with prescriber/PCP: 4/23/2019 Claudette Nowak MD OR same dept: Visit date not found OR same specialty: 4/23/2019 Claudette Nowak MD  Last physical: 8/20/2019 Last MTM visit: Visit date not found   Next visit within 3 mo: Visit date not found  Next physical within 3 mo: Visit date not found  Prescriber OR PCP: Claudette Nowak MD  Last diagnosis associated with med order: 1. HTN (hypertension)  - hydroCHLOROthiazide (MICROZIDE) 12.5 mg capsule; Take 1 capsule (12.5 mg total) by mouth daily.  Dispense: 90 capsule; Refill: 3    If protocol passes may refill for 12 months if within 3 months of last provider visit (or a total of 15 months).             Passed - Serum Potassium in past 12 months      Lab Results   Component Value Date    Potassium 4.2 09/04/2019             Passed - Serum Sodium in past 12 months      Lab Results   Component Value Date    Sodium 139 09/04/2019             Passed - Blood pressure on file in past 12 months     BP Readings from Last 1 Encounters:   08/20/19 128/74             Passed - Serum Creatinine in past 12 months      Creatinine   Date Value Ref Range Status   09/04/2019 0.77 0.60 - 1.10 mg/dL Final

## 2021-06-10 NOTE — TELEPHONE ENCOUNTER
Due for htn visit and probably annual exam. I can bridge to appt. Please call and set something up.

## 2021-06-11 NOTE — PROGRESS NOTES
ASSESSMENT AND PLAN:     Problem List Items Addressed This Visit        Unprioritized    Mixed hyperlipidemia - Primary (Chronic)     Repeat. Statin decision aid reviewed. Her 10 yr risk could drop from 5% to 3% with statin. She will recheck labs now and decide after that.          Relevant Orders    Lipid [place] FASTING    Hypertension (Chronic)     BP Readings from Last 3 Encounters:   09/15/20 132/76   08/20/19 128/74   04/23/19 112/62     Lisinopril 20mg po q day  hctz 12.5 mg po q day  cmp pending  ekg noted on file 2016 - repeat q 5 yrs for baseline  Follow up in 1 year if labs are normal.          Relevant Medications    hydroCHLOROthiazide (MICROZIDE) 12.5 mg capsule    lisinopriL (PRINIVIL,ZESTRIL) 20 MG tablet    Other Relevant Orders    Comprehensive Metabolic Panel    BMI 25.0-25.9,adult     Improved - she is very active right now.          Menopause     dexa due - order placed.          Relevant Orders    DXA Bone Density Scan    Preventative health care     Flu shot?  Will get at work.   Pap: 8/2022  Mammo: nl 3/2019  Colonoscopy: normal 9/2017 - repeat 9/2027  Std testing desired:  offered  Osteoporosis prevention discussed.  vitamin d levels ordered. Recommend daily calcium and vitamin d intake to keep good bone health. Recommend weight bearing exercise, no tobacco, and limit alcohol  dexa - ordered today.   Recommend sunscreen, exercise, & healthy diet.  Offered cbc, cmp, lipids and asked what other testing she  desires today  I have had an Advance Directives discussion with the patient.   Body mass index is 25.92 kg/m .   mychart active.             Other Visit Diagnoses     Screening, anemia, deficiency, iron        Relevant Orders    HM1(CBC and Differential)    Encounter for screening mammogram for malignant neoplasm of breast        Relevant Orders    Mammo Screening Bilateral    HTN (hypertension)        Relevant Medications    hydroCHLOROthiazide (MICROZIDE) 12.5 mg capsule    lisinopriL  (PRINIVIL,ZESTRIL) 20 MG tablet    Other Relevant Orders    Comprehensive Metabolic Panel           Chief Complaint   Patient presents with     Med check        HPI  Maral Marino is a 60 y.o. female comes in today for medication review, htn recheck, monitoring labs for meds and refills.     Social History     Tobacco Use   Smoking Status Former Smoker     Packs/day: 0.50     Years: 15.00     Pack years: 7.50   Smokeless Tobacco Never Used      Current Outpatient Medications on File Prior to Visit   Medication Sig Dispense Refill     aspirin 81 mg chewable tablet Chew 81 mg daily.       cholecalciferol, vitamin D3, (VITAMIN D3) 2,000 unit cap Take 1 capsule by mouth daily.       docoshexanoic acid-eicosapent 500 mg (FISH OIL) 500-100 mg cap capsule Take 500 mg by mouth.       Lactobacillus rhamnosus GG (CULTURELLE) 10-15 Billion cell capsule Take 1 capsule by mouth daily.       multivitamin with minerals (THERA-M) 9 mg iron-400 mcg Tab tablet Take 1 tablet by mouth daily.       [DISCONTINUED] hydroCHLOROthiazide (MICROZIDE) 12.5 mg capsule Take 1 capsule (12.5 mg total) by mouth daily. 90 capsule 0     [DISCONTINUED] lisinopril (PRINIVIL,ZESTRIL) 20 MG tablet Take 1 tablet (20 mg total) by mouth daily. 90 tablet 3     No current facility-administered medications on file prior to visit.       Allergies   Allergen Reactions     Wellbutrin [Bupropion Hcl]      Seizure       Review of Systems   Constitutional: Negative.    HENT: Negative.    Eyes: Negative.    Respiratory: Negative.    Cardiovascular: Negative.    Gastrointestinal: Negative.    Endocrine: Negative.    Genitourinary: Negative.    Musculoskeletal: Negative.    Skin: Negative.    Neurological: Negative.    Hematological: Negative.    Psychiatric/Behavioral: Negative.         OBJECTIVE: /76 (Patient Site: Left Arm, Patient Position: Sitting, Cuff Size: Adult Regular)   Pulse 68   Wt 144 lb (65.3 kg)   SpO2 98%   BMI 25.92 kg/m     Physical  Exam  Constitutional:       General: She is not in acute distress.     Appearance: She is well-developed.   HENT:      Head: Normocephalic and atraumatic.   Eyes:      Conjunctiva/sclera: Conjunctivae normal.   Neck:      Musculoskeletal: Neck supple.   Cardiovascular:      Rate and Rhythm: Normal rate and regular rhythm.   Pulmonary:      Effort: Pulmonary effort is normal.   Musculoskeletal: Normal range of motion.   Neurological:      Mental Status: She is alert and oriented to person, place, and time.          Additional History from Old Records Summarized (2): yes  Decision to Obtain Records (1): no  Radiology Tests Summarized or Ordered (1): yes  Labs Reviewed or Ordered (1): yes  Medicine Test Summarized or Ordered (1): yes  Independent Review of EKG or X-RAY(2 each): no    This note was created using Dragon dictation.  Please excuse any grammatical errors.

## 2021-06-12 NOTE — TELEPHONE ENCOUNTER
Refill Approved    Rx renewed per Medication Renewal Policy. Medication was last renewed on 09/15/2020.  Last office visit was 09/15/2020 with PCP.    Iesha Kidd, [facility] Triage/Med Refill 11/8/2020     Requested Prescriptions   Pending Prescriptions Disp Refills     lisinopriL (PRINIVIL,ZESTRIL) 20 MG tablet 90 tablet 0     Sig: Take 1 tablet (20 mg total) by mouth daily.       Ace Inhibitors Refill Protocol Passed - 11/7/2020 11:11 AM        Passed - PCP or prescribing provider visit in past 12 months       Last office visit with prescriber/PCP: 9/15/2020 Claudette Nowak MD OR same dept: 9/15/2020 Claudette Nowak MD OR same specialty: 9/15/2020 Claudette Nowak MD  Last physical: 8/20/2019 Last MTM visit: Visit date not found   Next visit within 3 mo: Visit date not found  Next physical within 3 mo: Visit date not found  Prescriber OR PCP: Claudette Nowak MD  Last diagnosis associated with med order: 1. HTN (hypertension)  - lisinopriL (PRINIVIL,ZESTRIL) 20 MG tablet; Take 1 tablet (20 mg total) by mouth daily.  Dispense: 90 tablet; Refill: 0    If protocol passes may refill for 12 months if within 3 months of last provider visit (or a total of 15 months).             Passed - Serum Potassium in past 12 months     Lab Results   Component Value Date    Potassium 4.1 10/14/2020             Passed - Blood pressure filed in past 12 months     BP Readings from Last 1 Encounters:   09/15/20 132/76             Passed - Serum Creatinine in past 12 months     Creatinine   Date Value Ref Range Status   10/14/2020 0.81 0.60 - 1.10 mg/dL Final

## 2021-06-13 NOTE — TELEPHONE ENCOUNTER
Refill Approved    Rx renewed per Medication Renewal Policy. Medication was last renewed on 9/15/20, last OV 9/15/20.    Sharla Dean, [facility] Triage/Med Refill 11/26/2020     Requested Prescriptions   Pending Prescriptions Disp Refills     hydroCHLOROthiazide (MICROZIDE) 12.5 mg capsule [Pharmacy Med Name: HYDROCHLOROTHIAZIDE 12.5MG CAPS] 90 capsule 0     Sig: TAKE ONE CAPSULE BY MOUTH ONCE DAILY       Diuretics/Combination Diuretics Refill Protocol  Passed - 11/25/2020  5:02 AM        Passed - Visit with PCP or prescribing provider visit in past 12 months     Last office visit with prescriber/PCP: 12/9/2016 Kayleen Ramos MD OR same dept: 9/15/2020 Claudette Nowak MD OR same specialty: 9/15/2020 Claudette Nwoak MD  Last physical: Visit date not found Last MTM visit: Visit date not found   Next visit within 3 mo: Visit date not found  Next physical within 3 mo: Visit date not found  Prescriber OR PCP: Kayleen Ramos MD  Last diagnosis associated with med order: 1. HTN (hypertension)  - hydroCHLOROthiazide (MICROZIDE) 12.5 mg capsule [Pharmacy Med Name: HYDROCHLOROTHIAZIDE 12.5MG CAPS]; TAKE ONE CAPSULE BY MOUTH ONCE DAILY  Dispense: 90 capsule; Refill: 0    If protocol passes may refill for 12 months if within 3 months of last provider visit (or a total of 15 months).             Passed - Serum Potassium in past 12 months      Lab Results   Component Value Date    Potassium 4.1 10/14/2020             Passed - Serum Sodium in past 12 months      Lab Results   Component Value Date    Sodium 139 10/14/2020             Passed - Blood pressure on file in past 12 months     BP Readings from Last 1 Encounters:   09/15/20 132/76             Passed - Serum Creatinine in past 12 months      Creatinine   Date Value Ref Range Status   10/14/2020 0.81 0.60 - 1.10 mg/dL Final

## 2021-06-15 NOTE — PROGRESS NOTES
ASSESSMENT and PLAN:  1. HTN (hypertension)  Stable and well controlled on current medication.  She was given refills for 1 year.  We will check electrolytes and renal function today.  - Comprehensive Metabolic Panel  - hydroCHLOROthiazide (MICROZIDE) 12.5 mg capsule; Take 1 capsule (12.5 mg total) by mouth daily.  Dispense: 90 capsule; Refill: 3  - lisinopril (PRINIVIL,ZESTRIL) 20 MG tablet; TAKE 1 TABLET BY MOUTH EVERY DAY  Dispense: 90 tablet; Refill: 3    2. Elevated cholesterol  This was noted in August 2016.  At that time, she was just recently in menopause.  She is not on medication or aggressively managing this at the present time.  We will recheck her labs today for follow-up.  - Lipid [place]  - Thyroid [place]      Medications Discontinued During This Encounter   Medication Reason     lisinopril (PRINIVIL,ZESTRIL) 20 MG tablet      phentermine 30 MG capsule Therapy completed     hydroCHLOROthiazide (MICROZIDE) 12.5 mg capsule Reorder     lisinopril (PRINIVIL,ZESTRIL) 20 MG tablet Reorder       No Follow-up on file.    Patient Instructions   Today's labs will be available on [facility].  If you aren't signed up, then we'll mail them out.  If anything needs more immediate follow up, we'll also call.    Take care!      CHIEF COMPLAINT:  Chief Complaint   Patient presents with     Follow-up     Medications and HTN       HISTORY OF PRESENT ILLNESS:  Maral Marino is a 57 y.o. female  presenting to the clinic today for follow-up of her hypertension.  She is currently taking 12.5 mg daily of hydrochlorothiazide and 20 mg once daily of lisinopril.  Her last blood work was in 2016 here.  She periodically checks her blood pressure at work or when she has symptoms.  The only time that it is not well controlled as if she is experiencing vertigo.  She is able to treat her vertigo at home with self exercises.  She has started to try and exercise.    She is due for her tetanus immunization and would be open to getting  that today.    When she had blood work done in August 2016, her cholesterol was significantly elevated.  At that time, her total was 308 and her LDL was 207.  That lab work was done in conjunction with a visit to the [place].    PFSH:  Family History   Problem Relation Age of Onset     Cancer Mother      Breast cancer Maternal Aunt      Age in early 50's     TOBACCO USE:  History   Smoking Status     Never Smoker   Smokeless Tobacco     Never Used       VITALS:  Vitals:    01/30/18 1331   BP: 132/72   Patient Site: Right Arm   Pulse: 68   Weight: 151 lb 8 oz (68.7 kg)     Wt Readings from Last 3 Encounters:   01/30/18 151 lb 8 oz (68.7 kg)   12/09/16 145 lb 4.8 oz (65.9 kg)   11/23/16 144 lb 9.6 oz (65.6 kg)         PHYSICAL EXAM:  Constitutional:  Reveals an alert, pleasant adult female.   Vitals:  Noted.   Eyes: PERRL, conjunctiva/corneas clear, EOM's intact   Lungs: Clear to auscultation bilaterally, respirations unlabored.   Heart: Regular rate and rhythm, S1 and S2 normal, no murmur, rub, or gallop,   Extremities: Extremities normal, atraumatic, no cyanosis or edema       QUALITY MEASURES:  The following high BMI interventions were performed this visit: weight monitoring    MEDICATIONS:  Current Outpatient Prescriptions   Medication Sig Dispense Refill     aspirin 81 mg chewable tablet Chew 81 mg daily.       cholecalciferol, vitamin D3, (VITAMIN D3) 2,000 unit cap Take 1 capsule by mouth daily.       docoshexanoic acid-eicosapent 500 mg (FISH OIL) 500-100 mg cap capsule Take 500 mg by mouth.       hydroCHLOROthiazide (MICROZIDE) 12.5 mg capsule Take 1 capsule (12.5 mg total) by mouth daily. 90 capsule 3     Lactobacillus rhamnosus GG (CULTURELLE) 10-15 Billion cell capsule Take 1 capsule by mouth daily.       lisinopril (PRINIVIL,ZESTRIL) 20 MG tablet TAKE 1 TABLET BY MOUTH EVERY DAY 90 tablet 3     multivitamin with minerals (THERA-M) 9 mg iron-400 mcg Tab tablet Take 1 tablet by mouth  daily.       prednisoLONE acetate (PRED-FORTE) 1 % ophthalmic suspension        No current facility-administered medications for this visit.

## 2021-06-16 NOTE — TELEPHONE ENCOUNTER
I have not seen this patient since 9/2020.  It looks like dr. henderson ordered the dexa, but I dont see a note from dr. Henderson so I am a bit confused..      Did she see dr. Henderson,?     I think this needs to be rerouted to ordering provider.

## 2021-06-16 NOTE — TELEPHONE ENCOUNTER
Dr. Henderson reads the Dexa scans, it was ordered by a [facility] provider she saw on 3/12/21.  Unable to find him in provider directory.  Unable to find ofv notes on this visit.  But dexa was done the next day.  Message left for pt who is she seeing for this.

## 2021-06-18 NOTE — PROGRESS NOTES
Clinic Note    Assessment:     Assessment and Plan:  1. Depression  Her PHQ 9 is 12 today.  She would like to hold off on any anti-depression medications for now.  She is very interested in meeting with the psychologist.  A referral was placed today and number was given.  - Amb Ref to [facility]    2. Insomnia  She is open to trying trazodone.  Plan is to have her follow-up with me in 1 month for a recheck of her symptoms.    3. Visit for screening mammogram  - Mammo Screening Bilateral; Future       Patient Instructions   Call the health psychology clinic to schedule an appointment. Their number is [phone].    Take the trazodone 1 hour before going to bed.    I like to schedule appointment for you to come back and see me in 1 month so that we can recheck your PHQ 9 and see how you are sleeping.    No Follow-up on file.         Subjective:      She would like a referral to therapy.     She has been dealing with a lot of issues and stressors at home. Her son is moving back home with her and this has been stressful. She has not been sleeping well. She was told that she should try   Sertraline to see if this would help but this caused too many side effects. She has not used therapy in the past. She says that she recently had a medium specialist come through her house and tell her that there was some spirits living in her house. Her PHQ-9 is 12 today; she says that depression is not necessarily new for her.  She does mediation, therapeutic yoga, healing touch, she feels like she can reach out to friends and family about her issues. She says that she has some decreased interest in doing things with her friends. Denies suicidal ideation. No homicidal ideation.     The following portions of the patient's history were reviewed and updated as appropriate: Allergies, medications, problem list, prior note.     Review of Systems:    Review is otherwise negative except for what is mentioned above.      Social Hx:    History   Smoking Status     Never Smoker   Smokeless Tobacco     Never Used         Objective:     Vitals:    06/18/18 1551   BP: 150/90   Pulse: 70   Weight: 151 lb 12.8 oz (68.9 kg)       Exam:    General: No apparent distress. Calm. Alert and Oriented X3. Pt behavior is appropriate.        Patient Active Problem List   Diagnosis     Mixed hyperlipidemia     Hypertension     BMI 28.0-28.9,adult     Current Outpatient Prescriptions   Medication Sig Dispense Refill     aspirin 81 mg chewable tablet Chew 81 mg daily.       cholecalciferol, vitamin D3, (VITAMIN D3) 2,000 unit cap Take 1 capsule by mouth daily.       docoshexanoic acid-eicosapent 500 mg (FISH OIL) 500-100 mg cap capsule Take 500 mg by mouth.       hydroCHLOROthiazide (MICROZIDE) 12.5 mg capsule Take 1 capsule (12.5 mg total) by mouth daily. 90 capsule 3     Lactobacillus rhamnosus GG (CULTURELLE) 10-15 Billion cell capsule Take 1 capsule by mouth daily.       lisinopril (PRINIVIL,ZESTRIL) 20 MG tablet TAKE 1 TABLET BY MOUTH EVERY DAY 90 tablet 3     multivitamin with minerals (THERA-M) 9 mg iron-400 mcg Tab tablet Take 1 tablet by mouth daily.       prednisoLONE acetate (PRED-FORTE) 1 % ophthalmic suspension        No current facility-administered medications for this visit.        I spent 25 minutes with patient face to face, of which >50% was counseling regarding the above plan       Easton Landers (Rob), ALLIE    6/18/2018

## 2021-06-19 NOTE — LETTER
Letter by Claudette Nowak MD at      Author: Claudette Nowak MD Service: -- Author Type: --    Filed:  Encounter Date: 6/28/2019 Status: (Other)         Maral HERNANDEZ Marino   [address] [facility] [address]      6/28/2019      Dear Maral,        Per our refill protocol, you are due for a medication check office visit. Your prescription for hydroCHLOROthiazide (MICROZIDE) 12.5 mg capsule was sent to your pharmacy. Please call [phone] to schedule an office visit with Claudette Nowak MD  before your next refill is needed to avoid delays.      Thank you  [facility]

## 2021-06-20 NOTE — LETTER
Letter by Claudette Nowak MD at      Author: Claudette Nowak MD Service: -- Author Type: --    Filed:  Encounter Date: 8/20/2020 Status: (Other)         Maral MARY Jamila   [address]  [facility] [address]      8/20/2020       Dear Maral,       In reviewing your records, we have determined a gap in your preventive services. Based on your age and health history, we recommend the follow service.     ? Blood pressure/cardiovascular check - so we can refill your medications   ? Lab work  ? Med check      If you have had the service elsewhere, please contact us so we can update our records. Please let us know if you have transferred your care to another clinic.    Please call [phone] to schedule this appointment.    We believe that a strong preventive care program, including regular physicals and follow-up care is an important part of a healthy lifestyle and we are committed to helping you maintain your health.    Thank you for choosing us as your health care provider.    Sincerely,     [facility]

## 2021-06-23 NOTE — TELEPHONE ENCOUNTER
Refill Approved    Rx renewed per Medication Renewal Policy. Medication was last renewed on 1/30/18.    Leila Noonan, [facility] Triage/Med Refill 1/13/2019     Requested Prescriptions   Pending Prescriptions Disp Refills     lisinopril (PRINIVIL,ZESTRIL) 20 MG tablet 90 tablet 3     Sig: TAKE 1 TABLET BY MOUTH EVERY DAY    Ace Inhibitors Refill Protocol Passed - 1/13/2019  1:43 PM       Passed - PCP or prescribing provider visit in past 12 months      Last office visit with prescriber/PCP: 1/30/2018 Kirstie Thompson MD OR same dept: 6/18/2018 Easton Landers CNP OR same specialty: 6/18/2018 Easton Landers CNP  Last physical: Visit date not found Last MTM visit: Visit date not found   Next visit within 3 mo: Visit date not found  Next physical within 3 mo: Visit date not found  Prescriber OR PCP: Kirstie Thompson MD  Last diagnosis associated with med order: 1. HTN (hypertension)  - lisinopril (PRINIVIL,ZESTRIL) 20 MG tablet; TAKE 1 TABLET BY MOUTH EVERY DAY  Dispense: 90 tablet; Refill: 3    If protocol passes may refill for 12 months if within 3 months of last provider visit (or a total of 15 months).            Passed - Serum Potassium in past 12 months    Lab Results   Component Value Date    Potassium 3.8 01/30/2018            Passed - Blood pressure filed in past 12 months    BP Readings from Last 1 Encounters:   06/18/18 150/90            Passed - Serum Creatinine in past 12 months    Creatinine   Date Value Ref Range Status   01/30/2018 0.81 0.60 - 1.10 mg/dL Final

## 2021-06-23 NOTE — TELEPHONE ENCOUNTER
Refill Approved    Rx renewed per Medication Renewal Policy. Medication was last renewed on 1/30/2018 with 3 refills.   Last office visit: 6/18/2018 with JAILYN Landers CNP.    Awilda Gutierrez, [facility] Triage/Med Refill 1/12/2019     Requested Prescriptions   Pending Prescriptions Disp Refills     hydroCHLOROthiazide (MICROZIDE) 12.5 mg capsule 90 capsule 3     Sig: Take 1 capsule (12.5 mg total) by mouth daily.    Diuretics/Combination Diuretics Refill Protocol  Passed - 1/12/2019 11:32 AM       Passed - Visit with PCP or prescribing provider visit in past 12 months    Last office visit with prescriber/PCP: 1/30/2018 Kirstie Thompson MD OR same dept: 6/18/2018 Easton Landers CNP OR same specialty: 6/18/2018 Easton Landers CNP  Last physical: Visit date not found Last MTM visit: Visit date not found   Next visit within 3 mo: Visit date not found  Next physical within 3 mo: Visit date not found  Prescriber OR PCP: Kirstie Thompson MD  Last diagnosis associated with med order: 1. HTN (hypertension)  - hydroCHLOROthiazide (MICROZIDE) 12.5 mg capsule; Take 1 capsule (12.5 mg total) by mouth daily.  Dispense: 90 capsule; Refill: 3    If protocol passes may refill for 12 months if within 3 months of last provider visit (or a total of 15 months).            Passed - Serum Potassium in past 12 months     Lab Results   Component Value Date    Potassium 3.8 01/30/2018            Passed - Serum Sodium in past 12 months     Lab Results   Component Value Date    Sodium 139 01/30/2018            Passed - Blood pressure on file in past 12 months    BP Readings from Last 1 Encounters:   06/18/18 150/90            Passed - Serum Creatinine in past 12 months     Creatinine   Date Value Ref Range Status   01/30/2018 0.81 0.60 - 1.10 mg/dL Final

## 2021-06-25 NOTE — TELEPHONE ENCOUNTER
RN cannot approve Refill Request    RN can NOT refill this medication overdue for office visits and/or labs.    Jc Jaimes, [facility] Triage/Med Refill 3/21/2019    Requested Prescriptions   Pending Prescriptions Disp Refills     lisinopril (PRINIVIL,ZESTRIL) 20 MG tablet 90 tablet 0     Sig: TAKE 1 TABLET BY MOUTH EVERY DAY    Ace Inhibitors Refill Protocol Failed - 3/19/2019  2:10 PM       Failed - Serum Potassium in past 12 months    No results found for: LN-POTASSIUM         Failed - Serum Creatinine in past 12 months    Creatinine   Date Value Ref Range Status   01/30/2018 0.81 0.60 - 1.10 mg/dL Final            Passed - PCP or prescribing provider visit in past 12 months      Last office visit with prescriber/PCP: 1/30/2018 Kirstie Thompson MD OR same dept: 6/18/2018 Easton Landers CNP OR same specialty: 6/18/2018 Easton Landers CNP  Last physical: Visit date not found Last MTM visit: Visit date not found   Next visit within 3 mo: Visit date not found  Next physical within 3 mo: Visit date not found  Prescriber OR PCP: Kirstie Thompson MD  Last diagnosis associated with med order: 1. HTN (hypertension)  - lisinopril (PRINIVIL,ZESTRIL) 20 MG tablet; TAKE 1 TABLET BY MOUTH EVERY DAY  Dispense: 90 tablet; Refill: 0    If protocol passes may refill for 12 months if within 3 months of last provider visit (or a total of 15 months).            Passed - Blood pressure filed in past 12 months    BP Readings from Last 1 Encounters:   06/18/18 150/90

## 2021-06-27 NOTE — PROGRESS NOTES
Progress Notes by Claudette Nowak MD at 8/20/2019  4:20 PM     Author: Claudette Nowak MD Service: -- Author Type: Physician    Filed: 8/20/2019  6:25 PM Encounter Date: 8/20/2019 Status: Signed    : Claudette Nowak MD (Physician)       FEMALE PREVENTATIVE EXAM    Assessment and Plan:       Problem List Items Addressed This Visit        Unprioritized    Mixed hyperlipidemia (Chronic)     Will recheck. Has lost weight. If still elevated statin likely indicated.          Hypertension (Chronic)     BP Readings from Last 3 Encounters:   08/20/19 128/74   04/23/19 112/62   06/18/18 150/90     Well controlled -   Continue hctz 12.5 mg po q day  Continue lisinopril 20 mgp oq day  Ck cmp to monitor drugs/renal          Relevant Medications    lisinopril (PRINIVIL,ZESTRIL) 20 MG tablet    hydroCHLOROthiazide (MICROZIDE) 12.5 mg capsule    BMI 26.0-26.9,adult     Weight down from 151 lbs to 148 lbs since 4/23/29. Healthy lifestyle changes.          Menopause     Stable.          Preventative health care     Flu shot recommended in the fall.   Pap: normal 8/24/2012 - due now.   Mammo: normal 3/20/2019  Colonoscopy: normal colonoscopy 9/2019   Std testing desired:  offered  Osteoporosis prevention discussed.  vitamin d levels ordered. Recommend daily calcium and vitamin d intake to keep good bone health. Recommend weight bearing exercise, no tobacco, and limit alcohol  dexa - ordered recently.  Recommend sunscreen, exercise, & healthy diet.  Offered tsh, glucose, hgb, lipid  I have had an Advance Directives discussion with the patient.   Body mass index is 26.64 kg/m .   mychart active.          RESOLVED: RUQ abdominal pain     Resolved. Patient states no intention to get ultrasound because sx are resolved.            Other Visit Diagnoses     Screening for malignant neoplasm of cervix    -  Primary    Relevant Orders    Gynecologic Cytology (PAP Smear)    Encounter for preventive care        Encounter for  hepatitis C screening test for low risk patient        Relevant Orders    Hepatitis C Antibody (Anti-HCV)    Screening for metabolic disorder        Relevant Orders    Comprehensive Metabolic Panel    Screening, anemia, deficiency, iron        Relevant Orders    HM1(CBC and Differential)    Screening, lipid        Relevant Orders    Lipid Cascade    HTN (hypertension)        Relevant Medications    lisinopril (PRINIVIL,ZESTRIL) 20 MG tablet    hydroCHLOROthiazide (MICROZIDE) 12.5 mg capsule        hiv screen offered but declined.     Next follow up:  No follow-ups on file.    Immunization Review  Adult Imm Review: No immunizations due today  Social History     Tobacco Use   Smoking Status Former Smoker   ? Packs/day: 0.50   ? Years: 15.00   ? Pack years: 7.50   Smokeless Tobacco Never Used        I discussed the following with the patient:   Adult Healthy Living: Importance of regular exercise  Healthy nutrition    I have had an Advance Directives discussion with the patient.    Subjective:   Chief Complaint: Maral Marino is an 59 y.o. female here for a preventative health visit.     HPI:  Has had a nice summer went to [place] for lobsterfest with a friend     Healthy Habits  Are you taking a daily aspirin? Yes  Do you typically exercising at least 40 min, 3-4 times per week?  Yes  Do you usually eat at least 4 servings of fruit and vegetables a day, include whole grains and fiber and avoid regularly eating high fat foods? Yes  Have you had an eye exam in the past two years? Yes  Do you see a dentist twice per year? NO  Do you have any concerns regarding sleep? No    Safety Screen  If you own firearms, are they secured in a locked gun cabinet or with trigger locks? The patient does not own any firearms  Do you feel you are safe where you are living?: Yes (8/20/2019  4:23 PM)  Do you feel you are safe in your relationship(s)?: Yes (8/20/2019  4:23 PM)      Review of Systems:  Please see above.  The rest of the  "review of systems are negative for all systems.     Pap History:   Yes - updated in Problem List and Health Maintenance accordingly  Cancer Screening       Status Date      PAP SMEAR Overdue 9/12/2017      Done 9/12/2012 GYNECOLOGIC CYTOLOGY (PAP SMEAR)    MAMMOGRAM Next Due 3/20/2021      Done 3/20/2019 MAMMO SCREENING BILATERAL     Patient has more history with this topic...    COLONOSCOPY Next Due 9/29/2027      Done 9/29/2017 COLONOSCOPY EXTERNAL RESULT     Patient has more history with this topic...          Patient Care Team:  Claudette Nowak MD as PCP - General (Family Medicine)  Shannon Browne MD as Physician (Obstetrics and Gynecology)        History     Reviewed By Date/Time Sections Reviewed    Claudette Nowak MD 8/20/2019  5:08 PM Social Documentation    Claudette Nowak MD 8/20/2019  4:41 PM Medical, Surgical    Olya Vaca CMA 8/20/2019  4:23 PM Tobacco            Objective:   Vital Signs:   Visit Vitals  /74 (Patient Site: Left Arm, Patient Position: Sitting, Cuff Size: Adult Regular)   Pulse 70   Ht 5' 2.5\" (1.588 m)   Wt 148 lb (67.1 kg)   BMI 26.64 kg/m           PHYSICAL EXAM  Physical Exam   Constitutional: She is oriented to person, place, and time. She appears well-developed and well-nourished. No distress.   HENT:   Head: Normocephalic and atraumatic.   Right Ear: External ear normal.   Left Ear: External ear normal.   Nose: Nose normal.   Mouth/Throat: Oropharynx is clear and moist.   Eyes: Conjunctivae are normal.   Neck: Neck supple.   Cardiovascular: Normal rate, regular rhythm and normal heart sounds.   Pulmonary/Chest: Effort normal and breath sounds normal.   Abdominal: Soft. Bowel sounds are normal.   Genitourinary: Pelvic exam was performed with patient supine. No labial fusion. There is no rash, tenderness, lesion or injury on the right labia. There is no rash, tenderness, lesion or injury on the left labia. Uterus is not deviated, not enlarged, not " fixed and not tender. Cervix exhibits no motion tenderness, no discharge and no friability. Right adnexum displays no mass, no tenderness and no fullness. Left adnexum displays no mass, no tenderness and no fullness. No erythema, tenderness or bleeding in the vagina. No foreign body in the vagina. No signs of injury around the vagina. No vaginal discharge found.   Genitourinary Comments: Dry appearing vaginal mucosa with loss of rugae and mildly stenotic vaginal introitus.    Musculoskeletal: Normal range of motion.   Neurological: She is alert and oriented to person, place, and time.   Skin: Skin is warm and dry.   Psychiatric: She has a normal mood and affect.       The 10-year ASCVD risk score (Raquel HIREN Jr., et al., 2013) is: 6%    Values used to calculate the score:      Age: 59 years      Sex: Female      Is Non- : No      Diabetic: No      Tobacco smoker: No      Systolic Blood Pressure: 128 mmHg      Is BP treated: Yes      HDL Cholesterol: 47 mg/dL      Total Cholesterol: 286 mg/dL         Medication List           Accurate as of 8/20/19  6:22 PM. If you have any questions, ask your nurse or doctor.               CHANGE how you take these medications    hydroCHLOROthiazide 12.5 mg capsule  Also known as:  MICROZIDE  INSTRUCTIONS:  Take 1 capsule (12.5 mg total) by mouth daily.  What changed:  See the new instructions.  Changed by:  Claudette Nowak MD           CONTINUE taking these medications    aspirin 81 mg chewable tablet  INSTRUCTIONS:  Chew 81 mg daily.        docoshexanoic acid-eicosapent 500 mg 500-100 mg Cap capsule  Also known as:  FISH OIL  INSTRUCTIONS:  Take 500 mg by mouth.        Lactobacillus rhamnosus GG 10-15 Billion cell capsule  Also known as:  CULTURELLE  INSTRUCTIONS:  Take 1 capsule by mouth daily.        lisinopril 20 MG tablet  Also known as:  PRINIVIL,ZESTRIL  INSTRUCTIONS:  Take 1 tablet (20 mg total) by mouth daily.        multivitamin with minerals 9 mg  iron-400 mcg Tab tablet  Also known as:  THERA-M  INSTRUCTIONS:  Take 1 tablet by mouth daily.        VITAMIN D3 2,000 unit capsule  INSTRUCTIONS:  Take 1 capsule by mouth daily.  Generic drug:  cholecalciferol (vitamin D3)              Where to Get Your Medications      These medications were sent to [id number] - [place], [place] - [address] AT [address] , [facility] [address]    Phone:  [phone]     hydroCHLOROthiazide 12.5 mg capsule    lisinopril 20 MG tablet         Additional Screenings Completed Today:

## 2021-07-21 ENCOUNTER — RECORDS - HEALTHEAST (OUTPATIENT)
Dept: ADMINISTRATIVE | Facility: CLINIC | Age: 61
End: 2021-07-21

## 2021-08-02 DIAGNOSIS — I10 HYPERTENSION, UNSPECIFIED TYPE: ICD-10-CM

## 2021-08-03 RX ORDER — LISINOPRIL 20 MG/1
20 TABLET ORAL DAILY
Qty: 90 TABLET | Refills: 2 | Status: SHIPPED | OUTPATIENT
Start: 2021-08-03 | End: 2021-12-28

## 2021-08-11 DIAGNOSIS — I10 HTN (HYPERTENSION): ICD-10-CM

## 2021-08-13 RX ORDER — HYDROCHLOROTHIAZIDE 12.5 MG/1
CAPSULE ORAL
Qty: 90 CAPSULE | Refills: 0 | Status: SHIPPED | OUTPATIENT
Start: 2021-08-13 | End: 2021-12-28

## 2021-08-13 NOTE — TELEPHONE ENCOUNTER
"Last Written Prescription Date:  11/26/20  Last Fill Quantity: 90,  # refills: 2   Last office visit provider:  9/15/20     Requested Prescriptions   Pending Prescriptions Disp Refills     hydrochlorothiazide (MICROZIDE) 12.5 MG capsule [Pharmacy Med Name: HYDROCHLOROTHIAZIDE 12.5MG CAPS] 90 capsule 2     Sig: TAKE ONE CAPSULE BY MOUTH ONCE DAILY       Diuretics (Including Combos) Protocol Passed - 8/11/2021  5:03 AM        Passed - Blood pressure under 140/90 in past 12 months     BP Readings from Last 3 Encounters:   09/15/20 132/76                 Passed - Recent (12 mo) or future (30 days) visit within the authorizing provider's specialty     Patient has had an office visit with the authorizing provider or a provider within the authorizing providers department within the previous 12 mos or has a future within next 30 days. See \"Patient Info\" tab in inbasket, or \"Choose Columns\" in Meds & Orders section of the refill encounter.              Passed - Medication is active on med list        Passed - Patient is age 18 or older        Passed - No active pregancy on record        Passed - Normal serum creatinine on file in past 12 months     Recent Labs   Lab Test 10/14/20  0717   CR 0.81              Passed - Normal serum potassium on file in past 12 months     Recent Labs   Lab Test 10/14/20  0717   POTASSIUM 4.1                    Passed - Normal serum sodium on file in past 12 months     Recent Labs   Lab Test 10/14/20  0717                 Passed - No positive pregnancy test in past 12 months             Ronni Faria RN 08/13/21 9:28 AM  "

## 2021-08-28 ENCOUNTER — HEALTH MAINTENANCE LETTER (OUTPATIENT)
Age: 61
End: 2021-08-28

## 2021-10-23 ENCOUNTER — HEALTH MAINTENANCE LETTER (OUTPATIENT)
Age: 61
End: 2021-10-23

## 2021-11-04 DIAGNOSIS — I10 HYPERTENSION, UNSPECIFIED TYPE: ICD-10-CM

## 2021-11-06 NOTE — TELEPHONE ENCOUNTER
"Routing refill request to provider for review/approval because:  Labs not current:  multiple  Patient needs to be seen because it has been more than 1 year since last office visit.  BP check due    Last Written Prescription Date:  8/13/21  Last Fill Quantity: 90,  # refills: 0   Last office visit provider:  9/15/20    Requested Prescriptions   Pending Prescriptions Disp Refills     hydrochlorothiazide (MICROZIDE) 12.5 MG capsule [Pharmacy Med Name: HYDROCHLOROTHIAZIDE 12.5MG CAPS] 90 capsule 0     Sig: TAKE ONE CAPSULE BY MOUTH ONCE DAILY       Diuretics (Including Combos) Protocol Failed - 11/4/2021  5:03 AM        Failed - Blood pressure under 140/90 in past 12 months     BP Readings from Last 3 Encounters:   09/15/20 132/76                 Failed - Recent (12 mo) or future (30 days) visit within the authorizing provider's specialty     Patient has had an office visit with the authorizing provider or a provider within the authorizing providers department within the previous 12 mos or has a future within next 30 days. See \"Patient Info\" tab in inbasket, or \"Choose Columns\" in Meds & Orders section of the refill encounter.              Failed - Normal serum creatinine on file in past 12 months     Recent Labs   Lab Test 10/14/20  0717   CR 0.81              Failed - Normal serum potassium on file in past 12 months     Recent Labs   Lab Test 10/14/20  0717   POTASSIUM 4.1                    Failed - Normal serum sodium on file in past 12 months     Recent Labs   Lab Test 10/14/20  0717                 Passed - Medication is active on med list        Passed - Patient is age 18 or older        Passed - No active pregancy on record        Passed - No positive pregnancy test in past 12 months             Latanya Schneider RN 11/06/21 7:29 AM  "

## 2021-11-08 RX ORDER — HYDROCHLOROTHIAZIDE 12.5 MG/1
CAPSULE ORAL
Qty: 90 CAPSULE | Refills: 0 | OUTPATIENT
Start: 2021-11-08

## 2021-12-28 DIAGNOSIS — I10 HTN (HYPERTENSION): ICD-10-CM

## 2021-12-28 DIAGNOSIS — I10 HYPERTENSION, UNSPECIFIED TYPE: ICD-10-CM

## 2021-12-28 RX ORDER — LISINOPRIL 20 MG/1
20 TABLET ORAL DAILY
Qty: 30 TABLET | Refills: 0 | Status: SHIPPED | OUTPATIENT
Start: 2021-12-28 | End: 2022-01-21

## 2021-12-28 RX ORDER — HYDROCHLOROTHIAZIDE 12.5 MG/1
CAPSULE ORAL
Qty: 30 CAPSULE | Refills: 0 | Status: SHIPPED | OUTPATIENT
Start: 2021-12-28 | End: 2022-01-21

## 2021-12-28 NOTE — TELEPHONE ENCOUNTER
Pt out of meds does have appt on 1/21/22 with you for med reveiew  Please advise on fill prepped below

## 2021-12-29 ENCOUNTER — TRANSFERRED RECORDS (OUTPATIENT)
Dept: HEALTH INFORMATION MANAGEMENT | Facility: CLINIC | Age: 61
End: 2021-12-29
Payer: COMMERCIAL

## 2022-01-06 ENCOUNTER — TRANSFERRED RECORDS (OUTPATIENT)
Dept: HEALTH INFORMATION MANAGEMENT | Facility: CLINIC | Age: 62
End: 2022-01-06
Payer: COMMERCIAL

## 2022-01-21 ENCOUNTER — OFFICE VISIT (OUTPATIENT)
Dept: FAMILY MEDICINE | Facility: CLINIC | Age: 62
End: 2022-01-21
Payer: COMMERCIAL

## 2022-01-21 VITALS
WEIGHT: 148.1 LBS | SYSTOLIC BLOOD PRESSURE: 130 MMHG | OXYGEN SATURATION: 98 % | HEART RATE: 87 BPM | BODY MASS INDEX: 26.24 KG/M2 | HEIGHT: 63 IN | DIASTOLIC BLOOD PRESSURE: 60 MMHG

## 2022-01-21 DIAGNOSIS — E78.2 MIXED HYPERLIPIDEMIA: ICD-10-CM

## 2022-01-21 DIAGNOSIS — I10 BENIGN ESSENTIAL HYPERTENSION: ICD-10-CM

## 2022-01-21 DIAGNOSIS — M85.80 OSTEOPENIA, UNSPECIFIED LOCATION: ICD-10-CM

## 2022-01-21 DIAGNOSIS — Z51.81 ENCOUNTER FOR THERAPEUTIC DRUG MONITORING: Primary | ICD-10-CM

## 2022-01-21 DIAGNOSIS — Z00.00 PREVENTATIVE HEALTH CARE: ICD-10-CM

## 2022-01-21 DIAGNOSIS — R79.89 LOW SERUM SODIUM: ICD-10-CM

## 2022-01-21 DIAGNOSIS — I10 HYPERTENSION, UNSPECIFIED TYPE: ICD-10-CM

## 2022-01-21 PROBLEM — Z78.0 MENOPAUSE PRESENT: Status: ACTIVE | Noted: 2019-04-23

## 2022-01-21 PROCEDURE — 99214 OFFICE O/P EST MOD 30 MIN: CPT | Mod: 25 | Performed by: FAMILY MEDICINE

## 2022-01-21 PROCEDURE — 91305 COVID-19,PF,PFIZER (12+ YRS): CPT | Performed by: FAMILY MEDICINE

## 2022-01-21 PROCEDURE — 0054A COVID-19,PF,PFIZER (12+ YRS): CPT | Performed by: FAMILY MEDICINE

## 2022-01-21 RX ORDER — LISINOPRIL 20 MG/1
20 TABLET ORAL DAILY
Qty: 90 TABLET | Refills: 3 | Status: SHIPPED | OUTPATIENT
Start: 2022-01-21 | End: 2023-02-03

## 2022-01-21 RX ORDER — CHLORAL HYDRATE 500 MG
2000 CAPSULE ORAL DAILY
COMMUNITY

## 2022-01-21 RX ORDER — HYDROCHLOROTHIAZIDE 12.5 MG/1
CAPSULE ORAL
Qty: 90 CAPSULE | Refills: 3 | Status: SHIPPED | OUTPATIENT
Start: 2022-01-21 | End: 2023-02-03

## 2022-01-21 ASSESSMENT — MIFFLIN-ST. JEOR: SCORE: 1197.97

## 2022-01-21 NOTE — ASSESSMENT & PLAN NOTE
BP Readings from Last 3 Encounters:   01/21/22 130/60   09/15/20 132/76      Lisinopril 20mg po q day  hctz 12.5 mg po q day  cmp pending  ekg noted on file 2016 - repeat q 5 yrs for baseline - she wants to skip today and do it next year instead.   Follow up in 1 year if labs are normal.

## 2022-01-21 NOTE — ASSESSMENT & PLAN NOTE
Calcium 600mg orally three times daily  Vitamin D was normal at 36 and she takes 4000 international unit(s) daily.   Avoid smoking  Avoid excess alcohol  Avoid caffeine   Regular weight bearing exercise - physical therapy ordered.   Repeat bone density scan 2 years (3/2023)    She had a wrist fracture so she will message ortho to see if they thought she has osteoporosis actually and if they think that we can advance to fosamax.

## 2022-01-21 NOTE — ASSESSMENT & PLAN NOTE
Covid vaccine - has had 2, not due for booster yet.   Flu shot done.   Pap: due 8/2022  Zoster vaccine.   Mammo: benign 12/2020  Colonoscopy: normal 12/2017 - repeat 12/2027  Std testing desired:  offered  Osteoporosis prevention discussed.  vitamin d levels ordered. Recommend daily calcium and vitamin d intake to keep good bone health. Recommend weight bearing exercise, no tobacco, and limit alcohol  dexa - osteopenia noted 2021  Recommend sunscreen, exercise, & healthy diet.  Offered cbc, cmp, lipids and asked what other testing she  desires today  I have had an Advance Directives discussion with the patient.   Body mass index is 26.66 kg/m .   mychart active.

## 2022-01-21 NOTE — PROGRESS NOTES
Assessment & Plan   Problem List Items Addressed This Visit        Endocrine    Mixed hyperlipidemia (Chronic)     Statin discussion again this year. Her risk over 10 years would be reduced from 6% to 4% with statin. She declined for now. We discussed ct calcium score and she wants to get that and knows she will have to pay out of pocket.          Relevant Orders    Lipid Profile    CT Coronary Calcium Scan       Circulatory    Benign essential hypertension     BP Readings from Last 3 Encounters:   01/21/22 130/60   09/15/20 132/76      Lisinopril 20mg po q day  hctz 12.5 mg po q day  cmp pending  ekg noted on file 2016 - repeat q 5 yrs for baseline - she wants to skip today and do it next year instead.   Follow up in 1 year if labs are normal.          Relevant Medications    lisinopril (ZESTRIL) 20 MG tablet    hydrochlorothiazide (MICROZIDE) 12.5 MG capsule    Hypertension    Relevant Medications    lisinopril (ZESTRIL) 20 MG tablet    hydrochlorothiazide (MICROZIDE) 12.5 MG capsule       Musculoskeletal and Integumentary    Osteopenia     Calcium 600mg orally three times daily  Vitamin D was normal at 36 and she takes 4000 international unit(s) daily.   Avoid smoking  Avoid excess alcohol  Avoid caffeine   Regular weight bearing exercise - physical therapy ordered.   Repeat bone density scan 2 years (3/2023)    She had a wrist fracture so she will message ortho to see if they thought she has osteoporosis actually and if they think that we can advance to fosamax.                 Relevant Orders    Physical Therapy Referral       Other    Preventative health care     Covid vaccine - has had 2, not due for booster yet.   Flu shot done.   Pap: due 8/2022  Zoster vaccine.   Mammo: benign 12/2020  Colonoscopy: normal 12/2017 - repeat 12/2027  Std testing desired:  offered  Osteoporosis prevention discussed.  vitamin d levels ordered. Recommend daily calcium and vitamin d intake to keep good bone health. Recommend  "weight bearing exercise, no tobacco, and limit alcohol  dexa - osteopenia noted 2021  Recommend sunscreen, exercise, & healthy diet.  Offered cbc, cmp, lipids and asked what other testing she  desires today  I have had an Advance Directives discussion with the patient.   Body mass index is 26.66 kg/m .   mychart active.                Other Visit Diagnoses     Encounter for therapeutic drug monitoring    -  Primary    Relevant Orders    CBC with platelets    Comprehensive metabolic panel (BMP + Alb, Alk Phos, ALT, AST, Total. Bili, TP)            BMI:   Estimated body mass index is 26.66 kg/m  as calculated from the following:    Height as of this encounter: 1.588 m (5' 2.5\").    Weight as of this encounter: 67.2 kg (148 lb 1.6 oz).   Weight management plan: Discussed healthy diet and exercise guidelines    Patient Instructions   Return in about 1 year (around 1/21/2023).      Return in about 1 year (around 1/21/2023) for Routine preventive.    Claudette Nowak MD  [facility]    Chief Complaint   Patient presents with     RECHECK     Refill Request        Subjective   Maral is a 61 year old who presents for the following health issues - chart review and med refills.           Objective    /60 (BP Location: Left arm, Patient Position: Sitting, Cuff Size: Adult Regular)   Pulse 87   Ht 1.588 m (5' 2.5\")   Wt 67.2 kg (148 lb 1.6 oz)   SpO2 98%   BMI 26.66 kg/m    Body mass index is 26.66 kg/m .  Physical Exam  Constitutional:       Appearance: Normal appearance.   HENT:      Head: Normocephalic and atraumatic.   Cardiovascular:      Rate and Rhythm: Normal rate and regular rhythm.   Pulmonary:      Effort: Pulmonary effort is normal.   Musculoskeletal:         General: Normal range of motion.      Cervical back: Normal range of motion and neck supple.   Neurological:      General: No focal deficit present.      Mental Status: She is alert and oriented to person, place, and time.      "

## 2022-01-21 NOTE — ASSESSMENT & PLAN NOTE
Statin discussion again this year. Her risk over 10 years would be reduced from 6% to 4% with statin. She declined for now. We discussed ct calcium score and she wants to get that and knows she will have to pay out of pocket.

## 2022-01-31 ENCOUNTER — HOSPITAL ENCOUNTER (OUTPATIENT)
Dept: CT IMAGING | Facility: CLINIC | Age: 62
Discharge: HOME | End: 2022-01-31
Attending: FAMILY MEDICINE | Admitting: FAMILY MEDICINE
Payer: COMMERCIAL

## 2022-01-31 DIAGNOSIS — E78.2 MIXED HYPERLIPIDEMIA: ICD-10-CM

## 2022-01-31 LAB
CV CALCIUM SCORE AGATSTON LM: 6
CV CALCIUM SCORING AGATSON LAD: 164
CV CALCIUM SCORING AGATSTON CX: 0
CV CALCIUM SCORING AGATSTON RCA: 198
CV CALCIUM SCORING AGATSTON TOTAL: 368

## 2022-01-31 PROCEDURE — 75571 CT HRT W/O DYE W/CA TEST: CPT | Mod: 26 | Performed by: INTERNAL MEDICINE

## 2022-01-31 PROCEDURE — 75571 CT HRT W/O DYE W/CA TEST: CPT

## 2022-02-01 ENCOUNTER — LAB (OUTPATIENT)
Dept: LAB | Facility: CLINIC | Age: 62
End: 2022-02-01
Payer: COMMERCIAL

## 2022-02-01 DIAGNOSIS — E78.2 MIXED HYPERLIPIDEMIA: ICD-10-CM

## 2022-02-01 DIAGNOSIS — Z51.81 ENCOUNTER FOR THERAPEUTIC DRUG MONITORING: ICD-10-CM

## 2022-02-01 PROBLEM — F10.10 ALCOHOL ABUSE: Status: RESOLVED | Noted: 2022-02-01 | Resolved: 2022-02-01

## 2022-02-01 PROBLEM — F10.10 ALCOHOL ABUSE: Status: ACTIVE | Noted: 2022-02-01

## 2022-02-01 LAB
ALBUMIN SERPL-MCNC: 4.4 G/DL (ref 3.5–5)
ALP SERPL-CCNC: 63 U/L (ref 45–120)
ALT SERPL W P-5'-P-CCNC: 18 U/L (ref 0–45)
ANION GAP SERPL CALCULATED.3IONS-SCNC: 9 MMOL/L (ref 5–18)
AST SERPL W P-5'-P-CCNC: 17 U/L (ref 0–40)
BILIRUB SERPL-MCNC: 0.6 MG/DL (ref 0–1)
BUN SERPL-MCNC: 20 MG/DL (ref 8–22)
CALCIUM SERPL-MCNC: 9.7 MG/DL (ref 8.5–10.5)
CHLORIDE BLD-SCNC: 101 MMOL/L (ref 98–107)
CHOLEST SERPL-MCNC: 294 MG/DL
CO2 SERPL-SCNC: 24 MMOL/L (ref 22–31)
CREAT SERPL-MCNC: 0.77 MG/DL (ref 0.6–1.1)
ERYTHROCYTE [DISTWIDTH] IN BLOOD BY AUTOMATED COUNT: 12.1 % (ref 10–15)
FASTING STATUS PATIENT QL REPORTED: YES
GFR SERPL CREATININE-BSD FRML MDRD: 87 ML/MIN/1.73M2
GLUCOSE BLD-MCNC: 98 MG/DL (ref 70–125)
HCT VFR BLD AUTO: 42 % (ref 35–47)
HDLC SERPL-MCNC: 67 MG/DL
HGB BLD-MCNC: 14.3 G/DL (ref 11.7–15.7)
LDLC SERPL CALC-MCNC: 195 MG/DL
MCH RBC QN AUTO: 31.8 PG (ref 26.5–33)
MCHC RBC AUTO-ENTMCNC: 34 G/DL (ref 31.5–36.5)
MCV RBC AUTO: 93 FL (ref 78–100)
PLATELET # BLD AUTO: 272 10E3/UL (ref 150–450)
POTASSIUM BLD-SCNC: 4 MMOL/L (ref 3.5–5)
PROT SERPL-MCNC: 7.6 G/DL (ref 6–8)
RBC # BLD AUTO: 4.5 10E6/UL (ref 3.8–5.2)
SODIUM SERPL-SCNC: 134 MMOL/L (ref 136–145)
TRIGL SERPL-MCNC: 160 MG/DL
WBC # BLD AUTO: 4.6 10E3/UL (ref 4–11)

## 2022-02-01 PROCEDURE — 85027 COMPLETE CBC AUTOMATED: CPT

## 2022-02-01 PROCEDURE — 80061 LIPID PANEL: CPT

## 2022-02-01 PROCEDURE — 80053 COMPREHEN METABOLIC PANEL: CPT

## 2022-02-01 PROCEDURE — 36415 COLL VENOUS BLD VENIPUNCTURE: CPT

## 2022-02-01 PROCEDURE — 82040 ASSAY OF SERUM ALBUMIN: CPT

## 2022-12-01 ENCOUNTER — HOSPITAL ENCOUNTER (OUTPATIENT)
Dept: RADIOLOGY | Facility: CLINIC | Age: 62
Discharge: HOME | End: 2022-12-01
Attending: INTERNAL MEDICINE
Payer: COMMERCIAL

## 2022-12-01 ENCOUNTER — TELEPHONE (OUTPATIENT)
Dept: ONCOLOGY | Facility: CLINIC | Age: 62
End: 2022-12-01

## 2022-12-01 DIAGNOSIS — M89.319 CLAVICLE ENLARGEMENT: ICD-10-CM

## 2022-12-01 DIAGNOSIS — M89.319 CLAVICLE ENLARGEMENT: Primary | ICD-10-CM

## 2022-12-01 PROCEDURE — 71046 X-RAY EXAM CHEST 2 VIEWS: CPT

## 2022-12-01 PROCEDURE — 73000 X-RAY EXAM OF COLLAR BONE: CPT | Mod: LT

## 2022-12-01 NOTE — TELEPHONE ENCOUNTER
Unexplained left medial clavicular swelling without pain or apparent injury x 1 day.    Exam revealed non-erythematous swelling of left medial clavicle without overlying skin changes or tenderness. This does not appear to be a separate soft tissue mass from the underlying bone.    Will get 2V chest and left clavicle X-rays.    Ruben Patel MD

## 2023-02-03 DIAGNOSIS — I10 HYPERTENSION, UNSPECIFIED TYPE: ICD-10-CM

## 2023-02-03 DIAGNOSIS — I10 BENIGN ESSENTIAL HYPERTENSION: ICD-10-CM

## 2023-02-03 RX ORDER — HYDROCHLOROTHIAZIDE 12.5 MG/1
CAPSULE ORAL
Qty: 90 CAPSULE | Refills: 0 | Status: SHIPPED | OUTPATIENT
Start: 2023-02-03 | End: 2023-03-31

## 2023-02-03 RX ORDER — LISINOPRIL 20 MG/1
20 TABLET ORAL DAILY
Qty: 90 TABLET | Refills: 0 | Status: SHIPPED | OUTPATIENT
Start: 2023-02-03 | End: 2023-03-31

## 2023-02-18 ENCOUNTER — HEALTH MAINTENANCE LETTER (OUTPATIENT)
Age: 63
End: 2023-02-18

## 2023-03-31 ENCOUNTER — OFFICE VISIT (OUTPATIENT)
Dept: FAMILY MEDICINE | Facility: CLINIC | Age: 63
End: 2023-03-31
Payer: COMMERCIAL

## 2023-03-31 VITALS
SYSTOLIC BLOOD PRESSURE: 127 MMHG | WEIGHT: 145 LBS | BODY MASS INDEX: 25.69 KG/M2 | OXYGEN SATURATION: 99 % | DIASTOLIC BLOOD PRESSURE: 81 MMHG | HEIGHT: 63 IN | HEART RATE: 88 BPM | RESPIRATION RATE: 16 BRPM

## 2023-03-31 DIAGNOSIS — Z12.4 CERVICAL CANCER SCREENING: ICD-10-CM

## 2023-03-31 DIAGNOSIS — E87.1 HYPONATREMIA: ICD-10-CM

## 2023-03-31 DIAGNOSIS — Z00.00 PREVENTATIVE HEALTH CARE: ICD-10-CM

## 2023-03-31 DIAGNOSIS — I10 HYPERTENSION, UNSPECIFIED TYPE: ICD-10-CM

## 2023-03-31 DIAGNOSIS — I10 BENIGN ESSENTIAL HYPERTENSION: ICD-10-CM

## 2023-03-31 DIAGNOSIS — Z13.0 SCREENING, ANEMIA, DEFICIENCY, IRON: Primary | ICD-10-CM

## 2023-03-31 DIAGNOSIS — E78.2 MIXED HYPERLIPIDEMIA: Chronic | ICD-10-CM

## 2023-03-31 DIAGNOSIS — Z12.31 VISIT FOR SCREENING MAMMOGRAM: ICD-10-CM

## 2023-03-31 DIAGNOSIS — Z12.31 ENCOUNTER FOR SCREENING MAMMOGRAM FOR MALIGNANT NEOPLASM OF BREAST: ICD-10-CM

## 2023-03-31 DIAGNOSIS — L67.8 BRITTLE HAIR: ICD-10-CM

## 2023-03-31 LAB
ALBUMIN SERPL BCG-MCNC: 4.5 G/DL (ref 3.5–5.2)
ALP SERPL-CCNC: 54 U/L (ref 35–104)
ALT SERPL W P-5'-P-CCNC: 28 U/L (ref 10–35)
ANION GAP SERPL CALCULATED.3IONS-SCNC: 13 MMOL/L (ref 7–15)
AST SERPL W P-5'-P-CCNC: 26 U/L (ref 10–35)
BILIRUB SERPL-MCNC: 0.4 MG/DL
BUN SERPL-MCNC: 23.2 MG/DL (ref 8–23)
CALCIUM SERPL-MCNC: 9.4 MG/DL (ref 8.8–10.2)
CHLORIDE SERPL-SCNC: 103 MMOL/L (ref 98–107)
CREAT SERPL-MCNC: 0.8 MG/DL (ref 0.51–0.95)
DEPRECATED HCO3 PLAS-SCNC: 22 MMOL/L (ref 22–29)
ERYTHROCYTE [DISTWIDTH] IN BLOOD BY AUTOMATED COUNT: 12 % (ref 10–15)
GFR SERPL CREATININE-BSD FRML MDRD: 83 ML/MIN/1.73M2
GLUCOSE SERPL-MCNC: 101 MG/DL (ref 70–99)
HCT VFR BLD AUTO: 37.1 % (ref 35–47)
HGB BLD-MCNC: 13 G/DL (ref 11.7–15.7)
MCH RBC QN AUTO: 31.4 PG (ref 26.5–33)
MCHC RBC AUTO-ENTMCNC: 35 G/DL (ref 31.5–36.5)
MCV RBC AUTO: 90 FL (ref 78–100)
PLATELET # BLD AUTO: 269 10E3/UL (ref 150–450)
POTASSIUM SERPL-SCNC: 4 MMOL/L (ref 3.4–5.3)
PROT SERPL-MCNC: 7.1 G/DL (ref 6.4–8.3)
RBC # BLD AUTO: 4.14 10E6/UL (ref 3.8–5.2)
SODIUM SERPL-SCNC: 138 MMOL/L (ref 136–145)
TSH SERPL DL<=0.005 MIU/L-ACNC: 0.79 UIU/ML (ref 0.3–4.2)
WBC # BLD AUTO: 7.5 10E3/UL (ref 4–11)

## 2023-03-31 PROCEDURE — 90471 IMMUNIZATION ADMIN: CPT | Performed by: FAMILY MEDICINE

## 2023-03-31 PROCEDURE — 36415 COLL VENOUS BLD VENIPUNCTURE: CPT | Performed by: FAMILY MEDICINE

## 2023-03-31 PROCEDURE — 90750 HZV VACC RECOMBINANT IM: CPT | Performed by: FAMILY MEDICINE

## 2023-03-31 PROCEDURE — 84443 ASSAY THYROID STIM HORMONE: CPT | Performed by: FAMILY MEDICINE

## 2023-03-31 PROCEDURE — 80053 COMPREHEN METABOLIC PANEL: CPT | Performed by: FAMILY MEDICINE

## 2023-03-31 PROCEDURE — 85027 COMPLETE CBC AUTOMATED: CPT | Performed by: FAMILY MEDICINE

## 2023-03-31 PROCEDURE — 99214 OFFICE O/P EST MOD 30 MIN: CPT | Mod: 25 | Performed by: FAMILY MEDICINE

## 2023-03-31 RX ORDER — HYDROCHLOROTHIAZIDE 12.5 MG/1
CAPSULE ORAL
Qty: 90 CAPSULE | Refills: 0 | Status: SHIPPED | OUTPATIENT
Start: 2023-03-31 | End: 2023-05-05

## 2023-03-31 RX ORDER — LISINOPRIL 20 MG/1
20 TABLET ORAL DAILY
Qty: 90 TABLET | Refills: 0 | Status: SHIPPED | OUTPATIENT
Start: 2023-03-31 | End: 2023-05-05

## 2023-03-31 RX ORDER — ROSUVASTATIN CALCIUM 5 MG/1
5 TABLET, COATED ORAL DAILY
Qty: 90 TABLET | Refills: 3 | Status: SHIPPED | OUTPATIENT
Start: 2023-03-31 | End: 2023-05-05

## 2023-03-31 NOTE — ASSESSMENT & PLAN NOTE
Due for annual exam. She will schedule in the next three months so meds ordered for the next three months.

## 2023-03-31 NOTE — PROGRESS NOTES
Problem List Items Addressed This Visit        Endocrine    Mixed hyperlipidemia (Chronic)     1/31/22 ct coronary calcium score 368.00 hi risk, needs cards consult and statin asap.          Relevant Medications    rosuvastatin (CRESTOR) 5 MG tablet    Other Relevant Orders    Adult Cardiology Eval  Referral    Lipid Profile    Hyponatremia     Hx hyponatremia, will recheck.         Relevant Orders    Comprehensive metabolic panel (BMP + Alb, Alk Phos, ALT, AST, Total. Bili, TP)       Circulatory    Benign essential hypertension     Htn, controlled on current meds, continue  Lisinopril 20mg po q day  hctz 12.5 mg po q day         Relevant Medications    lisinopril (ZESTRIL) 20 MG tablet    hydrochlorothiazide (MICROZIDE) 12.5 MG capsule       Musculoskeletal and Integumentary    Brittle hair     Brittle hair, breaks off easily. tsh ordered today.         Relevant Orders    TSH with free T4 reflex       Other    Preventative health care     Due for annual exam. She will schedule in the next three months so meds ordered for the next three months.        Other Visit Diagnoses     Screening, anemia, deficiency, iron    -  Primary    Relevant Orders    CBC with platelets (Completed)    Cervical cancer screening        Visit for screening mammogram        Encounter for screening mammogram for malignant neoplasm of breast        Hypertension, unspecified type        Relevant Medications    lisinopril (ZESTRIL) 20 MG tablet    hydrochlorothiazide (MICROZIDE) 12.5 MG capsule         Follow-up Visit   Expected date:  Apr 30, 2023 (Approximate)      Follow Up Appointment Details:     Follow-up with whom?: PCP    Follow-Up for what?: Adult Preventive    Any Additional Chronic Condition Management?:  Comment - pap    How?: In Person    Is this an as-needed follow-up?: No                     Subjective   Maral is a 62 year old who presents for the following health issues   Chief Complaint   Patient presents with      "Recheck Medication     Hydrochlothiazide and lisinopril      History of Present Illness       Hypertension: She presents for follow up of hypertension.  She does not check blood pressure  regularly outside of the clinic. Outpatient blood pressures have not been over 140/90. She does not follow a low salt diet.     She eats 4 or more servings of fruits and vegetables daily.She consumes 0 sweetened beverage(s) daily.She exercises with enough effort to increase her heart rate 30 to 60 minutes per day.  She exercises with enough effort to increase her heart rate 4 days per week.   She is taking medications regularly.           Objective    /81 (BP Location: Left arm, Patient Position: Sitting, Cuff Size: Adult Regular)   Pulse 88   Resp 16   Ht 1.588 m (5' 2.5\")   Wt 65.8 kg (145 lb)   SpO2 99%   BMI 26.10 kg/m    Body mass index is 26.1 kg/m .  Physical Exam  Constitutional:       Appearance: Normal appearance.   HENT:      Head: Normocephalic and atraumatic.   Cardiovascular:      Rate and Rhythm: Normal rate and regular rhythm.   Pulmonary:      Effort: Pulmonary effort is normal.   Musculoskeletal:         General: Normal range of motion.      Cervical back: Normal range of motion and neck supple.   Neurological:      General: No focal deficit present.      Mental Status: She is alert and oriented to person, place, and time.            This note has been dictated using voice recognition software. Any grammatical or context distortions are unintentional and inherent to the software      "

## 2023-04-04 ENCOUNTER — LAB (OUTPATIENT)
Dept: LAB | Facility: CLINIC | Age: 63
End: 2023-04-04
Payer: COMMERCIAL

## 2023-04-04 DIAGNOSIS — E78.2 MIXED HYPERLIPIDEMIA: Chronic | ICD-10-CM

## 2023-04-04 LAB
CHOLEST SERPL-MCNC: 285 MG/DL
FASTING STATUS PATIENT QL REPORTED: YES
HDLC SERPL-MCNC: 60 MG/DL
LDLC SERPL CALC-MCNC: 185 MG/DL
TRIGL SERPL-MCNC: 200 MG/DL

## 2023-04-04 PROCEDURE — 80061 LIPID PANEL: CPT

## 2023-04-04 PROCEDURE — 36415 COLL VENOUS BLD VENIPUNCTURE: CPT

## 2023-05-05 ENCOUNTER — OFFICE VISIT (OUTPATIENT)
Dept: CARDIOLOGY | Facility: CLINIC | Age: 63
End: 2023-05-05
Attending: FAMILY MEDICINE
Payer: COMMERCIAL

## 2023-05-05 ENCOUNTER — OFFICE VISIT (OUTPATIENT)
Dept: FAMILY MEDICINE | Facility: CLINIC | Age: 63
End: 2023-05-05
Payer: COMMERCIAL

## 2023-05-05 VITALS
DIASTOLIC BLOOD PRESSURE: 73 MMHG | BODY MASS INDEX: 25.98 KG/M2 | HEART RATE: 91 BPM | RESPIRATION RATE: 16 BRPM | WEIGHT: 146.6 LBS | OXYGEN SATURATION: 100 % | HEIGHT: 63 IN | SYSTOLIC BLOOD PRESSURE: 123 MMHG

## 2023-05-05 VITALS
WEIGHT: 146 LBS | BODY MASS INDEX: 25.87 KG/M2 | HEIGHT: 63 IN | SYSTOLIC BLOOD PRESSURE: 164 MMHG | HEART RATE: 88 BPM | RESPIRATION RATE: 16 BRPM | OXYGEN SATURATION: 98 % | DIASTOLIC BLOOD PRESSURE: 74 MMHG

## 2023-05-05 DIAGNOSIS — E87.1 HYPONATREMIA: ICD-10-CM

## 2023-05-05 DIAGNOSIS — E78.2 MIXED HYPERLIPIDEMIA: Chronic | ICD-10-CM

## 2023-05-05 DIAGNOSIS — R10.11 RUQ ABDOMINAL PAIN: ICD-10-CM

## 2023-05-05 DIAGNOSIS — I25.10 CORONARY ARTERY CALCIFICATION SEEN ON CT SCAN: Primary | ICD-10-CM

## 2023-05-05 DIAGNOSIS — I10 HYPERTENSION, UNSPECIFIED TYPE: ICD-10-CM

## 2023-05-05 DIAGNOSIS — I10 BENIGN ESSENTIAL HYPERTENSION: ICD-10-CM

## 2023-05-05 DIAGNOSIS — Z00.00 PREVENTATIVE HEALTH CARE: ICD-10-CM

## 2023-05-05 DIAGNOSIS — I25.10 CORONARY ARTERY CALCIFICATION SEEN ON CT SCAN: ICD-10-CM

## 2023-05-05 DIAGNOSIS — R73.9 HYPERGLYCEMIA: ICD-10-CM

## 2023-05-05 DIAGNOSIS — L67.8 BRITTLE HAIR: ICD-10-CM

## 2023-05-05 DIAGNOSIS — Z51.81 ENCOUNTER FOR THERAPEUTIC DRUG MONITORING: ICD-10-CM

## 2023-05-05 DIAGNOSIS — Z12.31 ENCOUNTER FOR SCREENING MAMMOGRAM FOR MALIGNANT NEOPLASM OF BREAST: ICD-10-CM

## 2023-05-05 DIAGNOSIS — E66.3 OVERWEIGHT WITH BODY MASS INDEX (BMI) OF 26 TO 26.9 IN ADULT: ICD-10-CM

## 2023-05-05 DIAGNOSIS — Z12.4 SCREENING FOR MALIGNANT NEOPLASM OF CERVIX: Primary | ICD-10-CM

## 2023-05-05 DIAGNOSIS — M85.80 OSTEOPENIA, UNSPECIFIED LOCATION: ICD-10-CM

## 2023-05-05 LAB — HBA1C MFR BLD: 5.2 %

## 2023-05-05 PROCEDURE — 87624 HPV HI-RISK TYP POOLED RSLT: CPT | Performed by: FAMILY MEDICINE

## 2023-05-05 PROCEDURE — 36415 COLL VENOUS BLD VENIPUNCTURE: CPT | Performed by: INTERNAL MEDICINE

## 2023-05-05 PROCEDURE — 99214 OFFICE O/P EST MOD 30 MIN: CPT | Mod: 25 | Performed by: FAMILY MEDICINE

## 2023-05-05 PROCEDURE — 99204 OFFICE O/P NEW MOD 45 MIN: CPT | Performed by: INTERNAL MEDICINE

## 2023-05-05 PROCEDURE — 83036 HEMOGLOBIN GLYCOSYLATED A1C: CPT | Performed by: INTERNAL MEDICINE

## 2023-05-05 PROCEDURE — G0145 SCR C/V CYTO,THINLAYER,RESCR: HCPCS | Performed by: FAMILY MEDICINE

## 2023-05-05 PROCEDURE — 99396 PREV VISIT EST AGE 40-64: CPT | Performed by: FAMILY MEDICINE

## 2023-05-05 RX ORDER — LISINOPRIL 20 MG/1
20 TABLET ORAL DAILY
Qty: 90 TABLET | Refills: 0 | Status: SHIPPED | OUTPATIENT
Start: 2023-05-05 | End: 2023-10-19

## 2023-05-05 RX ORDER — ROSUVASTATIN CALCIUM 40 MG/1
40 TABLET, COATED ORAL DAILY
Qty: 90 TABLET | Refills: 3 | Status: CANCELLED | OUTPATIENT
Start: 2023-05-05

## 2023-05-05 RX ORDER — HYDROCHLOROTHIAZIDE 12.5 MG/1
CAPSULE ORAL
Qty: 90 CAPSULE | Refills: 0 | Status: SHIPPED | OUTPATIENT
Start: 2023-05-05 | End: 2023-08-11

## 2023-05-05 RX ORDER — ROSUVASTATIN CALCIUM 40 MG/1
40 TABLET, COATED ORAL DAILY
Qty: 90 TABLET | Refills: 3 | Status: SHIPPED | OUTPATIENT
Start: 2023-05-05 | End: 2023-06-12 | Stop reason: SINTOL

## 2023-05-05 ASSESSMENT — ENCOUNTER SYMPTOMS
CHILLS: 0
HEMATURIA: 0
ABDOMINAL PAIN: 0
CONSTIPATION: 0
COUGH: 0
HEMATOCHEZIA: 0

## 2023-05-05 NOTE — ASSESSMENT & PLAN NOTE
Hypertension -controlled with current blood pressure 123/73 today   No change in plan-currently on lisinopril 20 and hydrochlorothiazide 12.5 daily.  Refilled and labs drawn to monitor

## 2023-05-05 NOTE — ASSESSMENT & PLAN NOTE
Hyperlipidemia, recheck lipids and lfts to monitor and continue crestor 40 mg po every day  She saw Dr. Rojo today and crestor just started this monring.   She is also getting a stress test.

## 2023-05-05 NOTE — ASSESSMENT & PLAN NOTE
Complains of intermittent right upper quadrant pain for the past 5-6 years, but she never mentioned before. Just happened last week. lfts are normal. Will get ruq us top investigate for hepatic or gallbladder issues.

## 2023-05-05 NOTE — PROGRESS NOTES
Preventive Cardiology Visit                   Thank you, Dr. Claudette Nowak, for asking the [facility] team to see Ms. Maral Marino to evaluate coronary artery calcification, mixed hyperlipidemia.    Assessment/Recommendations   Assessment:    1.  Elevated coronary artery calcium score with total of 368, and the majority in the LAD and right coronary artery.  At this point, she reports no symptoms of exertional chest discomfort or dyspnea although did experience some exertional dyspnea recently while working in the garden.  Suspect this may be more related to utilizing different muscle groups but did suggest a stress study to more fully rule out any ischemic component.  At this point, talked about the importance of aggressive risk factor modification, particularly driving her cholesterol levels low to slow progression of disease.  2.  Mixed hyperlipidemia, previously untreated.  At this point, given documentation of coronary artery disease, recommend driving LDL below 70.  This will not be accomplished with 5 mg of rosuvastatin.  Recommended going to 40 mg of rosuvastatin daily.  She will let me know if she has any muscle aches.  If she is able to tolerate this, would advise repeat lipid profile in 3 months with further recommendations to follow.  3.  Elevated triglycerides, this may be related to mixed hyperlipidemia although given mildly elevated glucose, suggested a hemoglobin A1c to make certain she is not prediabetic.  4.  Essential hypertension, well controlled.  Her blood pressure is elevated here today although I suspect this is an isolated finding as her blood pressures are more normotensive at work and at primary care visit.    Plan:  1.  Begin rosuvastatin 40 mg daily with plan to obtain a lipid profile in 3 months.  Patient to call if any side effects.  2.  Check hemoglobin A1c today  3.  Schedule stress echo study with further recommendations to follow.       History of Present  "Illness    Ms. Maral Marino is a 63 year old female with history of essential hypertension, untreated mixed hyperlipidemia, family history of premature coronary artery disease in her father who recently underwent CT coronary calcium scoring which was found to be quite elevated at 368 with the majority being in the LAD and right coronary arteries.  Was subsequently seen by her primary physician and started on baby aspirin and given a prescription for rosuvastatin 5 mg daily.  Has not started the rosuvastatin yet.  Reports no symptoms of exertional chest discomfort or dyspnea.  States she works out regularly at a local gym and does cardio for 60 minutes without any issues.  Did recently work out in the garden where she felt more short of breath.  No exertional chest discomfort.  No history of diabetes although recent blood sugar mildly elevated.  Blood pressure has been fairly well controlled based on blood pressures obtained at work as well as at primary care.  Does have a prior history of tobacco use but quit in 2008.    ECG (personally reviewed): No ECG today    Cardiac Imaging Studies (personally reviewed): No new imaging     Physical Examination Review of Systems   BP (!) 164/74 (BP Location: Left arm, Patient Position: Sitting, Cuff Size: Adult Regular)   Pulse 88   Resp 16   Ht 1.588 m (5' 2.5\")   Wt 66.2 kg (146 lb)   SpO2 98%   BMI 26.28 kg/m    Body mass index is 26.28 kg/m .  Wt Readings from Last 3 Encounters:   05/05/23 66.2 kg (146 lb)   03/31/23 65.8 kg (145 lb)   01/21/22 67.2 kg (148 lb 1.6 oz)     General Appearance:   Awake, Alert, No acute distress.   HEENT:  No scleral icterus; the mucous membranes were pink and moist.   Neck: No cervical bruits or jugular venous distention    Chest: The spine was straight. The chest was symmetric.   Lungs:   Respirations unlabored; the lungs are clear to auscultation. No wheezing   Cardiovascular:    Regular rate and rhythm.  S1, S2 normal.  No murmur or " "gallop   Abdomen:  No organomegaly, masses, bruits, or tenderness. Bowels sounds are present   Extremities:  No peripheral edema bilaterally   Skin: No xanthelasma. Warm, Dry.   Musculoskeletal: No tenderness.   Neurologic: Mood and affect are appropriate.    Enc Vitals  BP: (!) 164/74  Pulse: 88  Resp: 16  SpO2: 98 %  Weight: 66.2 kg (146 lb) (shoes on)  Height: 158.8 cm (5' 2.5\")                                         Medical History  Surgical History Family History Social History   Past Medical History:   Diagnosis Date     Alcohol abuse      Anxiety state      Coronary artery disease     coronary artery calcification     Dysthymic disorder      Hyperlipidemia      Hypertension      Insomnia     used essential oils, meditation and is doing fine now.     Menorrhagia     iud helped, now not an issue due to menopause.     RUQ abdominal pain 2019     Status epilepticus (H)     Past Surgical History:   Procedure Laterality Date     CATARACT EXTRACTION        SECTION       right wrist fracture Right      WISDOM TOOTH EXTRACTION       [facility]  DELIVERY ONLY      Description:  Section;  Recorded: 2009;     [facility]  DELIVERY ONLY      Description:  Section;  Recorded: 2009;    Family History   Problem Relation Age of Onset     Hypertension Mother      Colon Cancer Mother         colon      Cancer Mother      Atrial fibrillation Mother      Cerebrovascular Disease Father 62        carotid endarterectomy     Coronary Artery Disease Father 58        CABG     Peripheral Vascular Disease Father      Hypertension Father      Gout Father      Lung Cancer Father         lung     Breast Cancer Father      Heart Disease Father      Alzheimer Disease Maternal Grandmother      Heart Disease Paternal Grandmother      Coronary Artery Disease Brother 66        3 stents placed     No Known Problems Brother      No Known Problems Brother      Asthma Sister      Breast Cancer Sister      " Hypertension Sister      Hypertension Son      Substance Abuse Son      No Known Problems Son      Breast Cancer Maternal Aunt         Age in early 50's     Skin Cancer No family hx of      Osteoporosis No family hx of      Abnormal EKG No family hx of     Social History     Socioeconomic History     Marital status:      Spouse name: Not on file     Number of children: Not on file     Years of education: Not on file     Highest education level: Not on file   Occupational History     Not on file   Tobacco Use     Smoking status: Former     Packs/day: 0.50     Years: 18.00     Pack years: 9.00     Types: Cigarettes     Quit date: 2008     Years since quitting: 15.3     Smokeless tobacco: Never   Vaping Use     Vaping status: Not on file   Substance and Sexual Activity     Alcohol use: Yes     Alcohol/week: 0.0 standard drinks of alcohol     Comment: Alcoholic Drinks/day: 3-4 drinks per week      Drug use: Never     Sexual activity: Yes     Partners: Male     Birth control/protection: Post-menopausal   Other Topics Concern     Not on file   Social History Narrative    8/20/2019  to [employer] for 37.5 years. Went to [place] with friend for lobster[place] recently. New granddaughter, works at [employer].     9/15/2020 still doing good with marriage. Still works at [employer].      Social Determinants of Health     Financial Resource Strain: Not on file   Food Insecurity: Not on file   Transportation Needs: Not on file   Physical Activity: Not on file   Stress: Not on file   Social Connections: Not on file   Intimate Partner Violence: Not on file   Housing Stability: Not on file          Medications  Allergies   Current Outpatient Medications   Medication Sig Dispense Refill     aspirin 81 mg chewable tablet [ASPIRIN 81 MG CHEWABLE TABLET] Chew 81 mg daily.       cholecalciferol, vitamin D3, (VITAMIN D3) 2,000 unit cap [CHOLECALCIFEROL, VITAMIN D3, (VITAMIN D3) 2,000 UNIT  CAP] Take 1 capsule by mouth daily.       fish oil-omega-3 fatty acids 1000 MG capsule Take 2,000 mg by mouth daily       hydrochlorothiazide (MICROZIDE) 12.5 MG capsule TAKE ONE CAPSULE BY MOUTH ONCE DAILY 90 capsule 0     Lactobacillus rhamnosus GG (CULTURELLE) 10-15 Billion cell capsule [LACTOBACILLUS RHAMNOSUS GG (CULTURELLE) 10-15 BILLION CELL CAPSULE] Take 1 capsule by mouth daily.       lisinopril (ZESTRIL) 20 MG tablet Take 1 tablet (20 mg) by mouth daily 90 tablet 0     multivitamin with minerals (THERA-M) 9 mg iron-400 mcg Tab tablet [MULTIVITAMIN WITH MINERALS (THERA-M) 9 MG IRON-400 MCG TAB TABLET] Take 1 tablet by mouth daily.       rosuvastatin (CRESTOR) 40 MG tablet Take 1 tablet (40 mg) by mouth daily 90 tablet 3      Allergies   Allergen Reactions     Wellbutrin [Bupropion] Unknown     Seizure         Lab Results    Chemistry/lipid CBC Cardiac Enzymes/BNP/TSH/INR   Recent Labs   Lab Test 04/04/23  0724 03/31/23  1239   TRIG 200*  --    *  --    BUN  --  23.2*   NA  --  138   CO2  --  22    Recent Labs   Lab Test 03/31/23  1239   WBC 7.5   HGB 13.0   HCT 37.1   MCV 90       Recent Labs   Lab Test 03/31/23  1239   TSH 0.79        A total of 50 minutes was spent reviewing patient's medical records, obtaining history and performing examination, as well as discussing diagnoses/ recommendations with patient and answering all questions.

## 2023-05-05 NOTE — ASSESSMENT & PLAN NOTE
Contraception - menopause  Recommended vaccine covid #4  Pap: ordered  Mammo: ordered  Colonoscopy: due 2027  Std testing desired:  offered  Osteoporosis prevention discussed.  vitamin d levels ordered. Recommend daily calcium and vitamin d intake to keep good bone health. Recommend weight bearing exercise, no tobacco, and limit alcohol  dexa - due and ordered.   Recommend sunscreen, exercise, & healthy diet.  Offered cbc, cmp, lipids and asked what other testing she  desires today  I have had an Advance Directives discussion with the patient.   There is no height or weight on file to calculate BMI.   mynor active

## 2023-05-05 NOTE — LETTER
5/5/2023    Claudette Nowak MD  [address]  [facility] [address]    RE: Maral Marino       Dear Colleague,     I had the pleasure of seeing Maral Marino in the [facility].  Preventive Cardiology Visit                   Thank you, Dr. Claudette Nowak, for asking the [facility] team to see Ms. Maral Marino to evaluate coronary artery calcification, mixed hyperlipidemia.    Assessment/Recommendations   Assessment:    1.  Elevated coronary artery calcium score with total of 368, and the majority in the LAD and right coronary artery.  At this point, she reports no symptoms of exertional chest discomfort or dyspnea although did experience some exertional dyspnea recently while working in the garden.  Suspect this may be more related to utilizing different muscle groups but did suggest a stress study to more fully rule out any ischemic component.  At this point, talked about the importance of aggressive risk factor modification, particularly driving her cholesterol levels low to slow progression of disease.  2.  Mixed hyperlipidemia, previously untreated.  At this point, given documentation of coronary artery disease, recommend driving LDL below 70.  This will not be accomplished with 5 mg of rosuvastatin.  Recommended going to 40 mg of rosuvastatin daily.  She will let me know if she has any muscle aches.  If she is able to tolerate this, would advise repeat lipid profile in 3 months with further recommendations to follow.  3.  Elevated triglycerides, this may be related to mixed hyperlipidemia although given mildly elevated glucose, suggested a hemoglobin A1c to make certain she is not prediabetic.  4.  Essential hypertension, well controlled.  Her blood pressure is elevated here today although I suspect this is an isolated finding as her blood pressures are more normotensive at work and at primary care visit.    Plan:  1.  Begin rosuvastatin 40 mg daily with plan  "to obtain a lipid profile in 3 months.  Patient to call if any side effects.  2.  Check hemoglobin A1c today  3.  Schedule stress echo study with further recommendations to follow.       History of Present Illness    Ms. Maral Marino is a 63 year old female with history of essential hypertension, untreated mixed hyperlipidemia, family history of premature coronary artery disease in her father who recently underwent CT coronary calcium scoring which was found to be quite elevated at 368 with the majority being in the LAD and right coronary arteries.  Was subsequently seen by her primary physician and started on baby aspirin and given a prescription for rosuvastatin 5 mg daily.  Has not started the rosuvastatin yet.  Reports no symptoms of exertional chest discomfort or dyspnea.  States she works out regularly at a local gym and does cardio for 60 minutes without any issues.  Did recently work out in the garden where she felt more short of breath.  No exertional chest discomfort.  No history of diabetes although recent blood sugar mildly elevated.  Blood pressure has been fairly well controlled based on blood pressures obtained at work as well as at primary care.  Does have a prior history of tobacco use but quit in 2008.    ECG (personally reviewed): No ECG today    Cardiac Imaging Studies (personally reviewed): No new imaging     Physical Examination Review of Systems   BP (!) 164/74 (BP Location: Left arm, Patient Position: Sitting, Cuff Size: Adult Regular)   Pulse 88   Resp 16   Ht 1.588 m (5' 2.5\")   Wt 66.2 kg (146 lb)   SpO2 98%   BMI 26.28 kg/m    Body mass index is 26.28 kg/m .  Wt Readings from Last 3 Encounters:   05/05/23 66.2 kg (146 lb)   03/31/23 65.8 kg (145 lb)   01/21/22 67.2 kg (148 lb 1.6 oz)     General Appearance:   Awake, Alert, No acute distress.   HEENT:  No scleral icterus; the mucous membranes were pink and moist.   Neck: No cervical bruits or jugular venous distention    Chest: " "The spine was straight. The chest was symmetric.   Lungs:   Respirations unlabored; the lungs are clear to auscultation. No wheezing   Cardiovascular:    Regular rate and rhythm.  S1, S2 normal.  No murmur or gallop   Abdomen:  No organomegaly, masses, bruits, or tenderness. Bowels sounds are present   Extremities:  No peripheral edema bilaterally   Skin: No xanthelasma. Warm, Dry.   Musculoskeletal: No tenderness.   Neurologic: Mood and affect are appropriate.    Enc Vitals  BP: (!) 164/74  Pulse: 88  Resp: 16  SpO2: 98 %  Weight: 66.2 kg (146 lb) (shoes on)  Height: 158.8 cm (5' 2.5\")                                         Medical History  Surgical History Family History Social History   Past Medical History:   Diagnosis Date    Alcohol abuse     Anxiety state     Coronary artery disease     coronary artery calcification    Dysthymic disorder     Hyperlipidemia     Hypertension     Insomnia     used essential oils, meditation and is doing fine now.    Menorrhagia     iud helped, now not an issue due to menopause.    RUQ abdominal pain 2019    Status epilepticus (H)     Past Surgical History:   Procedure Laterality Date    CATARACT EXTRACTION       SECTION      right wrist fracture Right     WISDOM TOOTH EXTRACTION      [facility]  DELIVERY ONLY      Description:  Section;  Recorded: 2009;    [facility]  DELIVERY ONLY      Description:  Section;  Recorded: 2009;    Family History   Problem Relation Age of Onset    Hypertension Mother     Colon Cancer Mother         colon     Cancer Mother     Atrial fibrillation Mother     Cerebrovascular Disease Father 62        carotid endarterectomy    Coronary Artery Disease Father 58        CABG    Peripheral Vascular Disease Father     Hypertension Father     Gout Father     Lung Cancer Father         lung    Breast Cancer Father     Heart Disease Father     Alzheimer Disease Maternal Grandmother     Heart Disease Paternal " Grandmother     Coronary Artery Disease Brother 66        3 stents placed    No Known Problems Brother     No Known Problems Brother     Asthma Sister     Breast Cancer Sister     Hypertension Sister     Hypertension Son     Substance Abuse Son     No Known Problems Son     Breast Cancer Maternal Aunt         Age in early 50's    Skin Cancer No family hx of     Osteoporosis No family hx of     Abnormal EKG No family hx of     Social History     Socioeconomic History    Marital status:      Spouse name: Not on file    Number of children: Not on file    Years of education: Not on file    Highest education level: Not on file   Occupational History    Not on file   Tobacco Use    Smoking status: Former     Packs/day: 0.50     Years: 18.00     Pack years: 9.00     Types: Cigarettes     Quit date: 2008     Years since quitting: 15.3    Smokeless tobacco: Never   Vaping Use    Vaping status: Not on file   Substance and Sexual Activity    Alcohol use: Yes     Alcohol/week: 0.0 standard drinks of alcohol     Comment: Alcoholic Drinks/day: 3-4 drinks per week     Drug use: Never    Sexual activity: Yes     Partners: Male     Birth control/protection: Post-menopausal   Other Topics Concern    Not on file   Social History Narrative    8/20/2019  to [employer] for 37.5 years. Went to [place] with friend for [place] recently. New granddaughter, works at [employer].     9/15/2020 still doing good with marriage. Still works at [employer].      Social Determinants of Health     Financial Resource Strain: Not on file   Food Insecurity: Not on file   Transportation Needs: Not on file   Physical Activity: Not on file   Stress: Not on file   Social Connections: Not on file   Intimate Partner Violence: Not on file   Housing Stability: Not on file          Medications  Allergies   Current Outpatient Medications   Medication Sig Dispense Refill    aspirin 81 mg chewable tablet  [ASPIRIN 81 MG CHEWABLE TABLET] Chew 81 mg daily.      cholecalciferol, vitamin D3, (VITAMIN D3) 2,000 unit cap [CHOLECALCIFEROL, VITAMIN D3, (VITAMIN D3) 2,000 UNIT CAP] Take 1 capsule by mouth daily.      fish oil-omega-3 fatty acids 1000 MG capsule Take 2,000 mg by mouth daily      hydrochlorothiazide (MICROZIDE) 12.5 MG capsule TAKE ONE CAPSULE BY MOUTH ONCE DAILY 90 capsule 0    Lactobacillus rhamnosus GG (CULTURELLE) 10-15 Billion cell capsule [LACTOBACILLUS RHAMNOSUS GG (CULTURELLE) 10-15 BILLION CELL CAPSULE] Take 1 capsule by mouth daily.      lisinopril (ZESTRIL) 20 MG tablet Take 1 tablet (20 mg) by mouth daily 90 tablet 0    multivitamin with minerals (THERA-M) 9 mg iron-400 mcg Tab tablet [MULTIVITAMIN WITH MINERALS (THERA-M) 9 MG IRON-400 MCG TAB TABLET] Take 1 tablet by mouth daily.      rosuvastatin (CRESTOR) 40 MG tablet Take 1 tablet (40 mg) by mouth daily 90 tablet 3      Allergies   Allergen Reactions    Wellbutrin [Bupropion] Unknown     Seizure         Lab Results    Chemistry/lipid CBC Cardiac Enzymes/BNP/TSH/INR   Recent Labs   Lab Test 04/04/23  0724 03/31/23  1239   TRIG 200*  --    *  --    BUN  --  23.2*   NA  --  138   CO2  --  22    Recent Labs   Lab Test 03/31/23  1239   WBC 7.5   HGB 13.0   HCT 37.1   MCV 90       Recent Labs   Lab Test 03/31/23  1239   TSH 0.79        A total of 50 minutes was spent reviewing patient's medical records, obtaining history and performing examination, as well as discussing diagnoses/ recommendations with patient and answering all questions.                          Thank you for allowing me to participate in the care of your patient.      Sincerely,     Mirella Rojo MD     [facility]  cc:   Claudette Nowak MD  [address] [place] [address]

## 2023-05-05 NOTE — PATIENT INSTRUCTIONS
Begin rosuvastatin 40 mg daily. Let me know if you have any muscle aches. Our goal is to drive your LDL below 70 to slow progression of disease.  Check Hgb A1C today  Set up stress echo study with further recommendations to follow.

## 2023-05-07 ENCOUNTER — MYC MEDICAL ADVICE (OUTPATIENT)
Dept: FAMILY MEDICINE | Facility: CLINIC | Age: 63
End: 2023-05-07
Payer: COMMERCIAL

## 2023-05-07 ENCOUNTER — MYC REFILL (OUTPATIENT)
Dept: FAMILY MEDICINE | Facility: CLINIC | Age: 63
End: 2023-05-07
Payer: COMMERCIAL

## 2023-05-07 DIAGNOSIS — I10 HYPERTENSION, UNSPECIFIED TYPE: ICD-10-CM

## 2023-05-07 DIAGNOSIS — I10 BENIGN ESSENTIAL HYPERTENSION: ICD-10-CM

## 2023-05-07 RX ORDER — HYDROCHLOROTHIAZIDE 12.5 MG/1
CAPSULE ORAL
Qty: 90 CAPSULE | Refills: 0 | OUTPATIENT
Start: 2023-05-07

## 2023-05-10 LAB
BKR LAB AP GYN ADEQUACY: NORMAL
BKR LAB AP GYN INTERPRETATION: NORMAL
BKR LAB AP HPV REFLEX: NORMAL
BKR LAB AP PREVIOUS ABNORMAL: NORMAL
PATH REPORT.COMMENTS IMP SPEC: NORMAL
PATH REPORT.COMMENTS IMP SPEC: NORMAL
PATH REPORT.RELEVANT HX SPEC: NORMAL

## 2023-05-14 LAB
HUMAN PAPILLOMA VIRUS 16 DNA: NEGATIVE
HUMAN PAPILLOMA VIRUS 18 DNA: NEGATIVE
HUMAN PAPILLOMA VIRUS FINAL DIAGNOSIS: NORMAL
HUMAN PAPILLOMA VIRUS OTHER HR: NEGATIVE

## 2023-05-22 ENCOUNTER — HOSPITAL ENCOUNTER (OUTPATIENT)
Dept: MAMMOGRAPHY | Facility: CLINIC | Age: 63
Discharge: HOME | End: 2023-05-22
Attending: FAMILY MEDICINE | Admitting: FAMILY MEDICINE
Payer: COMMERCIAL

## 2023-05-22 DIAGNOSIS — Z12.31 ENCOUNTER FOR SCREENING MAMMOGRAM FOR MALIGNANT NEOPLASM OF BREAST: ICD-10-CM

## 2023-05-22 PROCEDURE — 77067 SCR MAMMO BI INCL CAD: CPT

## 2023-05-26 ENCOUNTER — HOSPITAL ENCOUNTER (OUTPATIENT)
Dept: CARDIOLOGY | Facility: CLINIC | Age: 63
Discharge: HOME | End: 2023-05-26
Attending: INTERNAL MEDICINE | Admitting: INTERNAL MEDICINE
Payer: COMMERCIAL

## 2023-05-26 DIAGNOSIS — E78.2 MIXED HYPERLIPIDEMIA: Chronic | ICD-10-CM

## 2023-05-26 DIAGNOSIS — I10 BENIGN ESSENTIAL HYPERTENSION: ICD-10-CM

## 2023-05-26 DIAGNOSIS — I25.10 CORONARY ARTERY CALCIFICATION SEEN ON CT SCAN: ICD-10-CM

## 2023-05-26 PROCEDURE — 93018 CV STRESS TEST I&R ONLY: CPT | Performed by: INTERNAL MEDICINE

## 2023-05-26 PROCEDURE — 93325 DOPPLER ECHO COLOR FLOW MAPG: CPT | Mod: TC

## 2023-05-26 PROCEDURE — 93321 DOPPLER ECHO F-UP/LMTD STD: CPT | Mod: 26 | Performed by: INTERNAL MEDICINE

## 2023-05-26 PROCEDURE — 93321 DOPPLER ECHO F-UP/LMTD STD: CPT | Mod: TC

## 2023-05-26 PROCEDURE — 93350 STRESS TTE ONLY: CPT | Mod: 26 | Performed by: INTERNAL MEDICINE

## 2023-05-26 PROCEDURE — 93325 DOPPLER ECHO COLOR FLOW MAPG: CPT | Mod: 26 | Performed by: INTERNAL MEDICINE

## 2023-05-26 PROCEDURE — 93016 CV STRESS TEST SUPVJ ONLY: CPT | Performed by: INTERNAL MEDICINE

## 2023-06-07 ENCOUNTER — MYC MEDICAL ADVICE (OUTPATIENT)
Dept: FAMILY MEDICINE | Facility: CLINIC | Age: 63
End: 2023-06-07
Payer: COMMERCIAL

## 2023-06-07 DIAGNOSIS — E78.2 MIXED HYPERLIPIDEMIA: Primary | Chronic | ICD-10-CM

## 2023-06-08 DIAGNOSIS — I25.10 CORONARY ARTERY CALCIFICATION SEEN ON CT SCAN: ICD-10-CM

## 2023-06-08 DIAGNOSIS — E78.2 MIXED HYPERLIPIDEMIA: Primary | ICD-10-CM

## 2023-06-12 RX ORDER — ATORVASTATIN CALCIUM 40 MG/1
40 TABLET, FILM COATED ORAL DAILY
Qty: 90 TABLET | Refills: 3 | Status: SHIPPED | OUTPATIENT
Start: 2023-06-12 | End: 2024-05-07

## 2023-06-26 ENCOUNTER — MYC MEDICAL ADVICE (OUTPATIENT)
Dept: FAMILY MEDICINE | Facility: CLINIC | Age: 63
End: 2023-06-26
Payer: COMMERCIAL

## 2023-06-27 ENCOUNTER — OFFICE VISIT (OUTPATIENT)
Dept: FAMILY MEDICINE | Facility: CLINIC | Age: 63
End: 2023-06-27
Payer: COMMERCIAL

## 2023-06-27 VITALS
HEART RATE: 78 BPM | WEIGHT: 144 LBS | SYSTOLIC BLOOD PRESSURE: 151 MMHG | OXYGEN SATURATION: 100 % | BODY MASS INDEX: 25.92 KG/M2 | DIASTOLIC BLOOD PRESSURE: 83 MMHG

## 2023-06-27 DIAGNOSIS — G44.40 DRUG-INDUCED HEADACHE, NOT ELSEWHERE CLASSIFIED, NOT INTRACTABLE: ICD-10-CM

## 2023-06-27 DIAGNOSIS — E78.2 MIXED HYPERLIPIDEMIA: Chronic | ICD-10-CM

## 2023-06-27 DIAGNOSIS — L67.8 BRITTLE HAIR: ICD-10-CM

## 2023-06-27 PROBLEM — R51.9 HEADACHE: Status: ACTIVE | Noted: 2023-06-27

## 2023-06-27 PROCEDURE — 99214 OFFICE O/P EST MOD 30 MIN: CPT | Performed by: FAMILY MEDICINE

## 2023-06-27 ASSESSMENT — ENCOUNTER SYMPTOMS
NAUSEA: 1
HEADACHES: 1

## 2023-06-27 NOTE — ASSESSMENT & PLAN NOTE
Intermittent daily headache in frontal area - pressure sensation started with initiation of Crestor, and continued despite changing Crestor to Lipitor. At this time we think this is drug induced. So will do 1 month holiday from statin and plan cardiology consult to manage lipids - see hyperlipidemia in problem list.     She is also getting eyes checked.

## 2023-06-27 NOTE — ASSESSMENT & PLAN NOTE
"6/27/2023 elevated coronary calcium score so cardiology consult was obtained and stress test was noted to be normal in May 2023    On chart review I see that she was given Crestor but then got headaches by Dr. Rojo.  This was switched to Lipitor 6/12/2023.  Dr. Rojo's notes from 6/13/2023 are as follows:    \"\"You will want to establish with another provider to look at other options for treating your mixed hyperlipidemia as it is important to try to drive your LDL below 70 given your elevated coronary calcium score.  They may try a lower dose of rosuvastatin or switch to atorvastatin to see whether you have recurrence of the headaches or try a different agent called a PCSK9 inhibitor which is an injectable medication used for patients who cannot tolerate statin drugs.\"    Noted most recent LDL is 184.  So this is well above the goal of 70.    Today she has been taking lipitor for a few weeks and still is feeling headache.     Recommend 1 month holiday from drug and follow up with cardiology to consider PCSK9 inhibitor.     "

## 2023-06-27 NOTE — PROGRESS NOTES
"  Assessment & Plan   Problem List Items Addressed This Visit        Nervous and Auditory    Headache     Intermittent daily headache in frontal area - pressure sensation started with initiation of Crestor, and continued despite changing Crestor to Lipitor. At this time we think this is drug induced. So will do 1 month holiday from statin and plan cardiology consult to manage lipids - see hyperlipidemia in problem list.     She is also getting eyes checked.          Relevant Orders    Adult Cardiology Eval  Referral       Endocrine    Mixed hyperlipidemia (Chronic)     6/27/2023 elevated coronary calcium score so cardiology consult was obtained and stress test was noted to be normal in May 2023    On chart review I see that she was given Crestor but then got headaches by Dr. Rojo.  This was switched to Lipitor 6/12/2023.  Dr. Rojo's notes from 6/13/2023 are as follows:    \"\"You will want to establish with another provider to look at other options for treating your mixed hyperlipidemia as it is important to try to drive your LDL below 70 given your elevated coronary calcium score.  They may try a lower dose of rosuvastatin or switch to atorvastatin to see whether you have recurrence of the headaches or try a different agent called a PCSK9 inhibitor which is an injectable medication used for patients who cannot tolerate statin drugs.\"    Noted most recent LDL is 184.  So this is well above the goal of 70.    Today she has been taking lipitor for a few weeks and still is feeling headache.     Recommend 1 month holiday from drug and follow up with cardiology to consider PCSK9 inhibitor.            Relevant Orders    Adult Cardiology Eval  Referral       Musculoskeletal and Integumentary    Brittle hair     Chart reviewed- noted on 5/11/23 derm unable to sched              Claudette Nowak MD  [facility]ia is a 63 year old, presenting for the following " "health issues:  Headache, Vertigo (Comes and goes/), and Nausea        6/27/2023     9:11 AM   Additional Questions   Roomed by as   Accompanied by self         6/27/2023     9:11 AM   Patient Reported Additional Medications   Patient reports taking the following new medications no     Headache   Associated symptoms include nausea.   Nausea  Associated symptoms include headaches and nausea.   History of Present Illness       Headaches:   Since the patient's last clinic visit, headaches are: worsened  The patient is getting headaches:  Daily  She is not able to do normal daily activities when she has a migraine.  The patient is taking the following rescue/relief medications:  Ibuprofen (Advil, Motrin), Naproxyn (Aleve) and Tylenol   Patient states \"The relief is inconsistent\" from the rescue/relief medications.   The patient is taking the following medications to prevent migraines:  No medications to prevent migraines  In the past 4 weeks, the patient has gone to an Urgent Care or Emergency Room 0 times times due to headaches.    She eats 2-3 servings of fruits and vegetables daily.She consumes 0 sweetened beverage(s) daily.She exercises with enough effort to increase her heart rate 20 to 29 minutes per day.  She exercises with enough effort to increase her heart rate 6 days per week.   She is taking medications regularly.       Review of Systems   Gastrointestinal: Positive for nausea.   Neurological: Positive for headaches.          Objective    BP (!) 151/83 (BP Location: Left arm, Patient Position: Left side, Cuff Size: Adult Large)   Pulse 78   Wt 65.3 kg (144 lb)   SpO2 100%   BMI 25.92 kg/m    Body mass index is 25.92 kg/m .  Physical Exam  Constitutional:       Appearance: Normal appearance.   HENT:      Head: Normocephalic and atraumatic.   Cardiovascular:      Rate and Rhythm: Normal rate and regular rhythm.   Pulmonary:      Effort: Pulmonary effort is normal.   Musculoskeletal:         General: Normal " range of motion.      Cervical back: Normal range of motion and neck supple.   Neurological:      General: No focal deficit present.      Mental Status: She is alert and oriented to person, place, and time.      Comments: Neuro exam is normal including gait, rapidly alternating movements, extraocular movements are intact, pupils are equally round and reactive to light and accommodation, there is no arm drift, she is able to stand on 1 foot rae time without losing balance, finger to nose is normal.  Coordination is normal.  Sensation is symmetrical bilaterally. facial symmetry is normal.  Normal Romberg exam.  Normal heel-to-shin.

## 2023-07-17 ENCOUNTER — TELEPHONE (OUTPATIENT)
Dept: CARDIOLOGY | Facility: CLINIC | Age: 63
End: 2023-07-17

## 2023-07-17 NOTE — TELEPHONE ENCOUNTER
[facility] Call Center    Phone Message    May a detailed message be left on voicemail: yes     Reason for Call: Other: New Referral please take a look to see how you would like it scheduled. Please call pt back to further coordinate appt.      Action Taken: Other: cardiology    Travel Screening: Not Applicable     Thank you!  [facility]

## 2023-08-09 DIAGNOSIS — I10 BENIGN ESSENTIAL HYPERTENSION: ICD-10-CM

## 2023-08-09 DIAGNOSIS — I10 HYPERTENSION, UNSPECIFIED TYPE: ICD-10-CM

## 2023-08-09 NOTE — TELEPHONE ENCOUNTER
"Routing refill request to provider for review/approval because:  B/p less than 140/90.    Last Written Prescription Date:  5-5-23  Last Fill Quantity: 90,  # refills: 0   Last office visit provider:  6-27-23     Requested Prescriptions   Pending Prescriptions Disp Refills    hydrochlorothiazide (MICROZIDE) 12.5 MG capsule [Pharmacy Med Name: HYDROCHLOROTHIAZIDE 12.5MG CAPS] 90 capsule 0     Sig: TAKE 1 CAPSULE BY MOUTH ONCE DAILY       Diuretics (Including Combos) Protocol Failed - 8/9/2023  5:03 AM        Failed - Blood pressure under 140/90 in past 12 months     BP Readings from Last 3 Encounters:   06/27/23 (!) 151/83   05/05/23 123/73   05/05/23 (!) 164/74                 Passed - Recent (12 mo) or future (30 days) visit within the authorizing provider's specialty     Patient has had an office visit with the authorizing provider or a provider within the authorizing providers department within the previous 12 mos or has a future within next 30 days. See \"Patient Info\" tab in inbasket, or \"Choose Columns\" in Meds & Orders section of the refill encounter.              Passed - Medication is active on med list        Passed - Patient is age 18 or older        Passed - No active pregancy on record        Passed - Normal serum creatinine on file in past 12 months     Recent Labs   Lab Test 03/31/23  1239   CR 0.80              Passed - Normal serum potassium on file in past 12 months     Recent Labs   Lab Test 03/31/23  1239   POTASSIUM 4.0                    Passed - Normal serum sodium on file in past 12 months     Recent Labs   Lab Test 03/31/23  1239                 Passed - No positive pregnancy test in past 12 months         Ashli Brice RN  [facility]      Ashli Brice RN 08/09/23 11:04 AM  "

## 2023-08-10 NOTE — TELEPHONE ENCOUNTER
Patient scheduled for 9/1/23 At 1:20pm for BP and headache follow up. Pt will need bridge until then. Thank you!

## 2023-08-11 RX ORDER — HYDROCHLOROTHIAZIDE 12.5 MG/1
CAPSULE ORAL
Qty: 90 CAPSULE | Refills: 0 | Status: SHIPPED | OUTPATIENT
Start: 2023-08-11 | End: 2023-11-02

## 2023-08-23 ENCOUNTER — TRANSFERRED RECORDS (OUTPATIENT)
Dept: HEALTH INFORMATION MANAGEMENT | Facility: CLINIC | Age: 63
End: 2023-08-23
Payer: COMMERCIAL

## 2023-08-30 ENCOUNTER — MYC MEDICAL ADVICE (OUTPATIENT)
Dept: FAMILY MEDICINE | Facility: CLINIC | Age: 63
End: 2023-08-30
Payer: COMMERCIAL

## 2023-09-22 NOTE — PROGRESS NOTES
Hypertension as well as medication management and work up of ruq abd pain addressed above and beyond usual scope of annual exam.      SUBJECTIVE:   CC: Maral is an 63 year old who presents for preventive health visit.       5/5/2023    12:16 PM   Additional Questions   Roomed by Josiah Maria MA   Accompanied by Self         5/5/2023    12:16 PM   Patient Reported Additional Medications   Patient reports taking the following new medications None   Patient has been advised of split billing requirements and indicates understanding: Yes  Healthy Habits:     Getting at least 3 servings of Calcium per day:  Yes    Bi-annual eye exam:  NO    Dental care twice a year:  NO    Sleep apnea or symptoms of sleep apnea:  None    Diet:  Regular (no restrictions)    Frequency of exercise:  4-5 days/week    Duration of exercise:  30-45 minutes    Taking medications regularly:  Yes    Medication side effects:  None    PHQ-2 Total Score: 0    Additional concerns today:  No    Not fasting, normal pap 8/20/19, mammo 12/3/20, dexa 3/12/21, colonoscopy 9/29/17    Today's PHQ-2 Score:       5/5/2023    12:19 PM   PHQ-2 ( 1999 [employer])   Q1: Little interest or pleasure in doing things 0   Q2: Feeling down, depressed or hopeless 0   PHQ-2 Score 0   Q1: Little interest or pleasure in doing things Not at all   Q2: Feeling down, depressed or hopeless Not at all   PHQ-2 Score 0           Social History     Tobacco Use     Smoking status: Former     Packs/day: 0.50     Years: 18.00     Pack years: 9.00     Types: Cigarettes     Quit date: 2008     Years since quitting: 15.3     Smokeless tobacco: Never   Vaping Use     Vaping status: Never Used   Substance Use Topics     Alcohol use: Yes     Alcohol/week: 0.0 standard drinks of alcohol     Comment: Alcoholic Drinks/day: 3-4 drinks per week              5/5/2023    12:19 PM   Alcohol Use   Prescreen: >3 drinks/day or >7 drinks/week? No     Reviewed orders with patient.  Reviewed health maintenance and  "updated orders accordingly - Yes    Breast Cancer Screening:    FHS-7:       5/5/2023    12:20 PM   Breast CA Risk Assessment (FHS-7)   Did any of your first-degree relatives have breast or ovarian cancer? No   Did any of your relatives have bilateral breast cancer? No       Pertinent mammograms are reviewed under the imaging tab.    History of abnormal Pap smear: NO - age 30-65 PAP every 5 years with negative HPV co-testing recommended      Latest Ref Rng & Units 8/20/2019     5:00 PM   PAP / HPV   PAP Negative for squamous intraepithelial lesion or malignancy. Negative for squamous intraepithelial lesion or malignancy  Electronically signed by Laine Rae CT (ASCP) on 8/23/2019 at 12:28 PM         Reviewed and updated as needed this visit by clinical staff   Tobacco  Allergies  Meds      Soc Hx        Reviewed and updated as needed this visit by Provider                     Review of Systems   Constitutional: Negative for chills.   HENT: Negative for congestion.    Respiratory: Negative for cough.    Cardiovascular: Negative for chest pain.   Gastrointestinal: Negative for abdominal pain, constipation and hematochezia.   Genitourinary: Negative for hematuria.          OBJECTIVE:   /73 (BP Location: Left arm, Patient Position: Sitting, Cuff Size: Adult Regular)   Pulse 91   Resp 16   Ht 1.588 m (5' 2.5\")   Wt 66.5 kg (146 lb 9.6 oz)   SpO2 100%   BMI 26.39 kg/m    Physical Exam  Constitutional:       Appearance: Normal appearance.   HENT:      Head: Normocephalic and atraumatic.      Right Ear: Tympanic membrane, ear canal and external ear normal.      Left Ear: Tympanic membrane, ear canal and external ear normal.      Nose: Nose normal.      Mouth/Throat:      Mouth: Mucous membranes are moist.      Pharynx: Oropharynx is clear.   Eyes:      Extraocular Movements: Extraocular movements intact.      Conjunctiva/sclera: Conjunctivae normal.      Pupils: Pupils are equal, round, and " reactive to light.   Cardiovascular:      Rate and Rhythm: Normal rate and regular rhythm.      Heart sounds: Normal heart sounds.   Pulmonary:      Effort: Pulmonary effort is normal.      Breath sounds: Normal breath sounds.   Chest:   Breasts:     Breasts are symmetrical.      Right: Normal. No swelling, bleeding, inverted nipple, mass, nipple discharge, skin change or tenderness.      Left: Normal. No swelling, bleeding, inverted nipple, mass, nipple discharge, skin change or tenderness.   Abdominal:      General: Bowel sounds are normal.      Palpations: Abdomen is soft.   Genitourinary:     General: Normal vulva.      Exam position: Lithotomy position.      Vagina: Normal.      Cervix: Normal.      Uterus: Normal.       Adnexa: Right adnexa normal and left adnexa normal.      Rectum: Normal.   Musculoskeletal:         General: Normal range of motion.      Cervical back: Normal range of motion and neck supple.   Skin:     General: Skin is warm and dry.      Capillary Refill: Capillary refill takes less than 2 seconds.   Neurological:      General: No focal deficit present.      Mental Status: She is alert and oriented to person, place, and time.   Psychiatric:         Mood and Affect: Mood normal.         Behavior: Behavior normal.         Thought Content: Thought content normal.         Judgment: Judgment normal.           ASSESSMENT/PLAN:     Problem List Items Addressed This Visit        Nervous and Auditory    RUQ abdominal pain     Complains of intermittent right upper quadrant pain for the past 5-6 years, but she never mentioned before. Just happened last week. lfts are normal. Will get ruq us top investigate for hepatic or gallbladder issues.          Relevant Orders    US Abdomen Limited       Endocrine    Mixed hyperlipidemia (Chronic)     Hyperlipidemia, recheck lipids and lfts to monitor and continue crestor 40 mg po every day  She saw Dr. Rojo today and crestor just started this monring.   She is  also getting a stress test.          RESOLVED: Hyponatremia     Hyponatremia in past, was normal in march - will resolve this            Circulatory    Benign essential hypertension     Hypertension -controlled with current blood pressure 123/73 today   No change in plan-currently on lisinopril 20 and hydrochlorothiazide 12.5 daily.  Refilled and labs drawn to monitor         Relevant Medications    lisinopril (ZESTRIL) 20 MG tablet    hydrochlorothiazide (MICROZIDE) 12.5 MG capsule       Musculoskeletal and Integumentary    Osteopenia     Osteopenia, due for dexa to monitor, ordered.          Relevant Orders    DX Hip/Pelvis/Spine    Brittle hair     Brittle hair, takes hair skin and nails supplement.          Relevant Orders    Adult Dermatology Referral       Other    Overweight with body mass index (BMI) of 26 to 26.9 in adult     Overweight, healthy lifestyle discussed.          Preventative health care     Contraception - menopause  Recommended vaccine covid #4  Pap: ordered  Mammo: ordered  Colonoscopy: due 2027  Std testing desired:  offered  Osteoporosis prevention discussed.  vitamin d levels ordered. Recommend daily calcium and vitamin d intake to keep good bone health. Recommend weight bearing exercise, no tobacco, and limit alcohol  dexa - due and ordered.   Recommend sunscreen, exercise, & healthy diet.  Offered cbc, cmp, lipids and asked what other testing she  desires today  I have had an Advance Directives discussion with the patient.   There is no height or weight on file to calculate BMI.   mynor active        Other Visit Diagnoses     Screening for malignant neoplasm of cervix    -  Primary    Relevant Orders    Pap screen with HPV - recommended age 30 - 65 years    Coronary artery calcification seen on CT scan        Hypertension, unspecified type        Relevant Medications    lisinopril (ZESTRIL) 20 MG tablet    hydrochlorothiazide (MICROZIDE) 12.5 MG capsule    Encounter for screening  "mammogram for malignant neoplasm of breast        Relevant Orders    *MA Screening Digital Bilateral    Encounter for therapeutic drug monitoring              Patient has been advised of split billing requirements and indicates understanding: Yes      COUNSELING:  Reviewed preventive health counseling, as reflected in patient instructions       Regular exercise       Healthy diet/nutrition       Osteoporosis prevention/bone health       Advance Care Planning      BMI:   Estimated body mass index is 26.39 kg/m  as calculated from the following:    Height as of this encounter: 1.588 m (5' 2.5\").    Weight as of this encounter: 66.5 kg (146 lb 9.6 oz).   Weight management plan: Discussed healthy diet and exercise guidelines      She reports that she quit smoking about 15 years ago. Her smoking use included cigarettes. She has a 9.00 pack-year smoking history. She has never used smokeless tobacco.      Claudette Nowak MD  [facility]" no

## 2023-10-13 ENCOUNTER — TELEPHONE (OUTPATIENT)
Dept: FAMILY MEDICINE | Facility: CLINIC | Age: 63
End: 2023-10-13
Payer: COMMERCIAL

## 2023-10-13 NOTE — TELEPHONE ENCOUNTER
Patient Quality Outreach    Patient is due for the following:   Hypertension -  BP check    Next Steps:   Patient has upcoming appointment, these items will be addressed at that time.    Type of outreach:    Chart review performed, no outreach needed.  Pt has PCP appt on 11/1/23.      Questions for provider review:    None           Tam Mathis Jr., MA

## 2023-10-18 DIAGNOSIS — I10 HYPERTENSION, UNSPECIFIED TYPE: ICD-10-CM

## 2023-10-18 NOTE — TELEPHONE ENCOUNTER
Needs follow-up.  Blood pressure is not controlled.  If she schedules an appointment it is okay to bridge.

## 2023-10-19 RX ORDER — LISINOPRIL 20 MG/1
20 TABLET ORAL DAILY
Qty: 90 TABLET | Refills: 0 | Status: SHIPPED | OUTPATIENT
Start: 2023-10-19 | End: 2023-12-27 | Stop reason: ALTCHOICE

## 2023-11-02 DIAGNOSIS — I10 BENIGN ESSENTIAL HYPERTENSION: ICD-10-CM

## 2023-11-02 DIAGNOSIS — I10 HYPERTENSION, UNSPECIFIED TYPE: ICD-10-CM

## 2023-11-02 RX ORDER — HYDROCHLOROTHIAZIDE 12.5 MG/1
CAPSULE ORAL
Qty: 90 CAPSULE | Refills: 0 | Status: SHIPPED | OUTPATIENT
Start: 2023-11-02 | End: 2024-01-29

## 2023-11-15 ENCOUNTER — TRANSFERRED RECORDS (OUTPATIENT)
Dept: HEALTH INFORMATION MANAGEMENT | Facility: CLINIC | Age: 63
End: 2023-11-15
Payer: COMMERCIAL

## 2023-12-27 ENCOUNTER — OFFICE VISIT (OUTPATIENT)
Dept: FAMILY MEDICINE | Facility: CLINIC | Age: 63
End: 2023-12-27
Payer: COMMERCIAL

## 2023-12-27 VITALS
TEMPERATURE: 98.9 F | HEART RATE: 75 BPM | WEIGHT: 145.5 LBS | SYSTOLIC BLOOD PRESSURE: 126 MMHG | RESPIRATION RATE: 16 BRPM | BODY MASS INDEX: 25.78 KG/M2 | OXYGEN SATURATION: 100 % | DIASTOLIC BLOOD PRESSURE: 78 MMHG | HEIGHT: 63 IN

## 2023-12-27 DIAGNOSIS — I10 BENIGN ESSENTIAL HYPERTENSION: Primary | ICD-10-CM

## 2023-12-27 DIAGNOSIS — Z00.00 HEALTH CARE MAINTENANCE: ICD-10-CM

## 2023-12-27 DIAGNOSIS — E78.2 MIXED HYPERLIPIDEMIA: Chronic | ICD-10-CM

## 2023-12-27 PROCEDURE — 99214 OFFICE O/P EST MOD 30 MIN: CPT | Mod: 25 | Performed by: FAMILY MEDICINE

## 2023-12-27 PROCEDURE — 90471 IMMUNIZATION ADMIN: CPT | Performed by: FAMILY MEDICINE

## 2023-12-27 PROCEDURE — 90750 HZV VACC RECOMBINANT IM: CPT | Performed by: FAMILY MEDICINE

## 2023-12-27 RX ORDER — LOSARTAN POTASSIUM 50 MG/1
50 TABLET ORAL DAILY
Qty: 90 TABLET | Refills: 0 | Status: SHIPPED | OUTPATIENT
Start: 2023-12-27 | End: 2024-02-26

## 2023-12-27 ASSESSMENT — ENCOUNTER SYMPTOMS
SHORTNESS OF BREATH: 0
EYE PAIN: 0
DYSURIA: 0
FEVER: 0
PSYCHIATRIC NEGATIVE: 1
COUGH: 0
PARESTHESIAS: 0
SORE THROAT: 0
PALPITATIONS: 0
FREQUENCY: 0
ABDOMINAL PAIN: 0
ARTHRALGIAS: 0
CHILLS: 0
CONSTIPATION: 0
ALLERGIC/IMMUNOLOGIC NEGATIVE: 1
HEADACHES: 0
WEAKNESS: 0
MYALGIAS: 0
DIARRHEA: 0
DIZZINESS: 0
BRUISES/BLEEDS EASILY: 0

## 2023-12-27 NOTE — ASSESSMENT & PLAN NOTE
Hypertension-the patient brings in blood pressures today and notes that her blood pressure has been running high 140s over 90s consistently.  However today in clinic the blood pressure is a bit lower 126/78.  She has never had any low blood pressures and so at this time we have decided to make the following adjustments:    Continue hydrochlorothiazide 12.5 mg p.o. daily  Discontinue lisinopril 20 mg p.o. daily  Start Cozaar 50 mg p.o. daily  Continue to monitor blood pressure at home  Follow-up in 1 to 2 months to repeat BMP and BP

## 2023-12-27 NOTE — ASSESSMENT & PLAN NOTE
Vaccines reviewed today-she will get her zoster vaccine today.  She will return to clinic a different day for her RSV and her pneumonia vaccines

## 2023-12-27 NOTE — PROGRESS NOTES
"  Assessment & Plan   Problem List Items Addressed This Visit          Endocrine    Mixed hyperlipidemia (Chronic)     Hyperlipidemia-the patient is requesting a repeat lipid panel to be ordered so that she can come in fasting to get that done.  She does have an appointment set up with a cardiologist to review her coronary calcium score which was elevated.         Relevant Orders    Lipid Profile       Circulatory    Benign essential hypertension - Primary     Hypertension-the patient brings in blood pressures today and notes that her blood pressure has been running high 140s over 90s consistently.  However today in clinic the blood pressure is a bit lower 126/78.  She has never had any low blood pressures and so at this time we have decided to make the following adjustments:    Continue hydrochlorothiazide 12.5 mg p.o. daily  Discontinue lisinopril 20 mg p.o. daily  Start Cozaar 50 mg p.o. daily  Continue to monitor blood pressure at home  Follow-up in 1 to 2 months to repeat BMP and BP         Relevant Medications    losartan (COZAAR) 50 MG tablet    Other Relevant Orders    Basic metabolic panel  (Ca, Cl, CO2, Creat, Gluc, K, Na, BUN)       Other    Health care maintenance     Vaccines reviewed today-she will get her zoster vaccine today.  She will return to clinic a different day for her RSV and her pneumonia vaccines              BMI:   Estimated body mass index is 26.19 kg/m  as calculated from the following:    Height as of this encounter: 1.588 m (5' 2.5\").    Weight as of this encounter: 66 kg (145 lb 8 oz).           Claudette Nowak MD  [facility] is a 63 year old, presenting for the following health issues:  Hypertension and Imm/Inj (Discuss vaccines today)        12/27/2023    11:04 AM   Additional Questions   Roomed by Josiah Maria MA   Accompanied by Self         12/27/2023    11:04 AM   Patient Reported Additional Medications   Patient reports taking the " "following new medications None       History of Present Illness       Hypertension: She presents for follow up of hypertension.  She does check blood pressure  regularly outside of the clinic. Outside blood pressures have been over 140/90. She does not follow a low salt diet.     She eats 4 or more servings of fruits and vegetables daily.She consumes 0 sweetened beverage(s) daily.She exercises with enough effort to increase her heart rate 20 to 29 minutes per day.  She exercises with enough effort to increase her heart rate 4 days per week.   She is taking medications regularly.       Review of Systems   Constitutional:  Negative for chills and fever.   HENT:  Negative for congestion, ear pain and sore throat.    Eyes:  Negative for pain and visual disturbance.   Respiratory:  Negative for cough and shortness of breath.    Cardiovascular:  Negative for chest pain, palpitations and peripheral edema.   Gastrointestinal:  Negative for abdominal pain, constipation and diarrhea.   Endocrine: Negative for polyuria.   Genitourinary:  Negative for dysuria, frequency and vaginal discharge.   Musculoskeletal:  Negative for arthralgias and myalgias.   Skin:  Negative for rash.   Allergic/Immunologic: Negative.    Neurological:  Negative for dizziness, weakness, headaches and paresthesias.   Hematological:  Does not bruise/bleed easily.   Psychiatric/Behavioral: Negative.              Objective    /78 (BP Location: Left arm, Patient Position: Sitting, Cuff Size: Adult Regular)   Pulse 75   Temp 98.9  F (37.2  C) (Oral)   Resp 16   Ht 1.588 m (5' 2.5\")   Wt 66 kg (145 lb 8 oz)   SpO2 100%   BMI 26.19 kg/m    Body mass index is 26.19 kg/m .  Physical Exam  Constitutional:       Appearance: Normal appearance.   HENT:      Head: Normocephalic and atraumatic.   Cardiovascular:      Rate and Rhythm: Normal rate and regular rhythm.   Pulmonary:      Effort: Pulmonary effort is normal.   Musculoskeletal:         General: " Normal range of motion.      Cervical back: Normal range of motion and neck supple.   Neurological:      General: No focal deficit present.      Mental Status: She is alert and oriented to person, place, and time.

## 2023-12-27 NOTE — ASSESSMENT & PLAN NOTE
Hyperlipidemia-the patient is requesting a repeat lipid panel to be ordered so that she can come in fasting to get that done.  She does have an appointment set up with a cardiologist to review her coronary calcium score which was elevated.

## 2024-01-28 DIAGNOSIS — I10 HYPERTENSION, UNSPECIFIED TYPE: ICD-10-CM

## 2024-01-28 DIAGNOSIS — I10 BENIGN ESSENTIAL HYPERTENSION: ICD-10-CM

## 2024-01-29 RX ORDER — HYDROCHLOROTHIAZIDE 12.5 MG/1
CAPSULE ORAL
Qty: 90 CAPSULE | Refills: 0 | Status: SHIPPED | OUTPATIENT
Start: 2024-01-29 | End: 2024-05-02

## 2024-02-26 ENCOUNTER — OFFICE VISIT (OUTPATIENT)
Dept: CARDIOLOGY | Facility: CLINIC | Age: 64
End: 2024-02-26
Payer: COMMERCIAL

## 2024-02-26 VITALS
OXYGEN SATURATION: 99 % | SYSTOLIC BLOOD PRESSURE: 158 MMHG | RESPIRATION RATE: 16 BRPM | BODY MASS INDEX: 26.64 KG/M2 | DIASTOLIC BLOOD PRESSURE: 92 MMHG | HEART RATE: 91 BPM | WEIGHT: 148 LBS

## 2024-02-26 DIAGNOSIS — G44.40 DRUG-INDUCED HEADACHE, NOT ELSEWHERE CLASSIFIED, NOT INTRACTABLE: ICD-10-CM

## 2024-02-26 DIAGNOSIS — I10 BENIGN ESSENTIAL HYPERTENSION: ICD-10-CM

## 2024-02-26 DIAGNOSIS — E78.2 MIXED HYPERLIPIDEMIA: ICD-10-CM

## 2024-02-26 DIAGNOSIS — I25.10 CORONARY ARTERY CALCIFICATION SEEN ON CT SCAN: ICD-10-CM

## 2024-02-26 PROCEDURE — 99214 OFFICE O/P EST MOD 30 MIN: CPT | Performed by: INTERNAL MEDICINE

## 2024-02-26 RX ORDER — LOSARTAN POTASSIUM 100 MG/1
100 TABLET ORAL DAILY
Qty: 90 TABLET | Refills: 3 | Status: SHIPPED | OUTPATIENT
Start: 2024-02-26

## 2024-02-26 NOTE — LETTER
2/26/2024    Claudette Nowak MD  [address]  [facility] [address]    RE: Maral Marino       Dear Colleague,     I had the pleasure of seeing Maral Marino in the [facility].    HEART CARE ENCOUNTER CONSULTATON NOTE      [facility]  [phone]      Assessment/Recommendations   Assessment:  1.  Coronary artery disease: CT coronary calcium score 368 placing her in the over 90th percentile for other women her age.  She also has risk factors including a significant family history and hypertension.  Goal LDL of less than 70.  2.  Dyslipidemia: Dyslipidemia is mainly hyperlipidemia and elevated triglycerides.  Most recent  off of statin therapy.  She has statin intolerance and will start on PCSK9 inhibitor.  3.  Hypertension: Poorly controlled  4.  Family history of premature coronary disease: Father history of bypass in his 50s    Plan:  1.  Recommend improvement in diet and we discussed this today  2.  Will start on Repatha with injections every 2 weeks and repeating fasting lipids in a few months time  3.  Increase losartan to 100 mg daily and continue on low-dose hydrochlorothiazide  4.  Continue on aspirin 81 mg daily  Follow-up in a year        History of Present Illness/Subjective    HPI: Maral Marino is a 63 year old female with history of coronary artery disease (CT coronary calcium score of 368 majority LAD/RCA 1/31/2022), family history of premature coronary disease, hyperlipidemia, hypertension who I am seeing today to establish care.  She has problems with statin therapy.  She was tried on Crestor 5 mg and that was then increased to 40 mg daily for better lipid management.  She has side effects even with the low-dose Crestor dose.  She was also tried on Lipitor and did not tolerate this either.  LDL tends to run around 180-200 off of statin therapy.  She does admit to a poor diet.  She has red meat 3-4 times a week and eggs on a daily basis.   She is very active.  She golfs, walks, skis and goes to [employer] 3 times a week on the treadmill.  Denies any exertional symptoms.  She did undergo an echocardiogram exercise stress test in May 2023 and this was negative for inducible ischemia.  She is also concerned about her blood pressure.  Blood pressure is now mildly elevated running 140-150s.       Physical Examination  Review of Systems   Vitals: BP (!) 158/92 (BP Location: Left arm, Patient Position: Sitting, Cuff Size: Adult Regular)   Pulse 91   Resp 16   Wt 67.1 kg (148 lb)   SpO2 99%   BMI 26.64 kg/m    BMI= Body mass index is 26.64 kg/m .  Wt Readings from Last 3 Encounters:   02/26/24 67.1 kg (148 lb)   12/27/23 66 kg (145 lb 8 oz)   06/27/23 65.3 kg (144 lb)       General Appearance:   no distress, normal body habitus   ENT/Mouth: membranes moist, no oral lesions or bleeding gums.      EYES:  no scleral icterus, normal conjunctivae   Neck: no carotid bruits or thyromegaly   Chest/Lungs:   lungs are clear to auscultation   Cardiovascular:   Regular. Normal first and second heart sounds with no murmur  no edema bilaterally    Abdomen:  no organomegaly, masses, bruits, or tenderness; bowel sounds are present   Extremities: no cyanosis or clubbing   Skin: no xanthelasma, warm.    Neurologic: normal  bilateral, no tremors     Psychiatric: alert and oriented x3, calm        Please refer above for cardiac ROS details.        Medical History  Surgical History Family History Social History   Past Medical History:   Diagnosis Date    Alcohol abuse     Anxiety state     Coronary artery disease     coronary artery calcification    Dysthymic disorder     Hyperlipidemia     Hypertension     Hyponatremia 3/31/2023    Insomnia     used essential oils, meditation and is doing fine now.    Menorrhagia     iud helped, now not an issue due to menopause.    RUQ abdominal pain 04/23/2019    Status epilepticus (H)      Past Surgical History:   Procedure  Laterality Date    CATARACT EXTRACTION       SECTION      right wrist fracture Right     WISDOM TOOTH EXTRACTION      [facility]  DELIVERY ONLY      Description:  Section;  Recorded: 2009;    [facility]  DELIVERY ONLY      Description:  Section;  Recorded: 2009;     Family History   Problem Relation Age of Onset    Hypertension Mother     Colon Cancer Mother         colon     Cancer Mother     Atrial fibrillation Mother     Cerebrovascular Disease Father 62        carotid endarterectomy    Coronary Artery Disease Father 58        CABG    Peripheral Vascular Disease Father     Hypertension Father     Gout Father     Lung Cancer Father         lung    Breast Cancer Father     Heart Disease Father     Alzheimer Disease Maternal Grandmother     Heart Disease Paternal Grandmother     Coronary Artery Disease Brother 66        3 stents placed    No Known Problems Brother     No Known Problems Brother     Asthma Sister     Breast Cancer Sister     Hypertension Sister     Hypertension Son     Substance Abuse Son     No Known Problems Son     Breast Cancer Maternal Aunt         Age in early 50's    Skin Cancer No family hx of     Osteoporosis No family hx of     Abnormal EKG No family hx of         Social History     Socioeconomic History    Marital status:      Spouse name: Not on file    Number of children: Not on file    Years of education: Not on file    Highest education level: Not on file   Occupational History    Not on file   Tobacco Use    Smoking status: Former     Packs/day: 0.50     Years: 18.00     Additional pack years: 0.00     Total pack years: 9.00     Types: Cigarettes     Quit date:      Years since quittin.1    Smokeless tobacco: Never   Vaping Use    Vaping Use: Never used   Substance and Sexual Activity    Alcohol use: Yes     Alcohol/week: 0.0 standard drinks of alcohol     Comment: Alcoholic Drinks/day: 3-4 drinks per week     Drug use: Never     Sexual activity: Yes     Partners: Male     Birth control/protection: Post-menopausal   Other Topics Concern    Not on file   Social History Narrative    8/20/2019  to [employer] for 37.5 years. Went to [place] with friend for lobsterfest recently. New granddaughter, works at [employer].     9/15/2020 still doing good with marriage. Still works at [employer].      Social Determinants of Health     Financial Resource Strain: Low Risk  (12/27/2023)    Financial Resource Strain     Within the past 12 months, have you or your family members you live with been unable to get utilities (heat, electricity) when it was really needed?: No   Food Insecurity: Low Risk  (12/27/2023)    Food Insecurity     Within the past 12 months, did you worry that your food would run out before you got money to buy more?: No     Within the past 12 months, did the food you bought just not last and you didn t have money to get more?: No   Transportation Needs: Low Risk  (12/27/2023)    Transportation Needs     Within the past 12 months, has lack of transportation kept you from medical appointments, getting your medicines, non-medical meetings or appointments, work, or from getting things that you need?: No   Physical Activity: Not on file   Stress: Not on file   Social Connections: Not on file   Interpersonal Safety: Low Risk  (12/27/2023)    Interpersonal Safety     Do you feel physically and emotionally safe where you currently live?: Yes     Within the past 12 months, have you been hit, slapped, kicked or otherwise physically hurt by someone?: No     Within the past 12 months, have you been humiliated or emotionally abused in other ways by your partner or ex-partner?: No   Housing Stability: Low Risk  (12/27/2023)    Housing Stability     Do you have housing? : Yes     Are you worried about losing your housing?: No           Medications  Allergies   Current Outpatient Medications   Medication Sig  "Dispense Refill    aspirin 81 mg chewable tablet [ASPIRIN 81 MG CHEWABLE TABLET] Chew 81 mg daily.      cholecalciferol, vitamin D3, (VITAMIN D3) 2,000 unit cap [CHOLECALCIFEROL, VITAMIN D3, (VITAMIN D3) 2,000 UNIT CAP] Take 1 capsule by mouth daily.      fish oil-omega-3 fatty acids 1000 MG capsule Take 2,000 mg by mouth daily      hydrochlorothiazide (MICROZIDE) 12.5 MG capsule TAKE 1 CAPSULE BY MOUTH ONCE DAILY 90 capsule 0    Lactobacillus rhamnosus GG (CULTURELLE) 10-15 Billion cell capsule [LACTOBACILLUS RHAMNOSUS GG (CULTURELLE) 10-15 BILLION CELL CAPSULE] Take 1 capsule by mouth daily.      losartan (COZAAR) 50 MG tablet Take 1 tablet (50 mg) by mouth daily 90 tablet 0    multivitamin with minerals (THERA-M) 9 mg iron-400 mcg Tab tablet [MULTIVITAMIN WITH MINERALS (THERA-M) 9 MG IRON-400 MCG TAB TABLET] Take 1 tablet by mouth daily.      atorvastatin (LIPITOR) 40 MG tablet Take 1 tablet (40 mg) by mouth daily (Patient not taking: Reported on 12/27/2023) 90 tablet 3       Allergies   Allergen Reactions    Wellbutrin [Bupropion] Unknown     Seizure          Lab Results    Chemistry/lipid CBC Cardiac Enzymes/BNP/TSH/INR   Recent Labs   Lab Test 04/04/23  0724   CHOL 285*   HDL 60   *   TRIG 200*     Recent Labs   Lab Test 04/04/23  0724 02/01/22  0730 10/14/20  0717   * 195* 164*     Recent Labs   Lab Test 03/31/23  1239      POTASSIUM 4.0   CHLORIDE 103   CO2 22   *   BUN 23.2*   CR 0.80   GFRESTIMATED 83   LISA 9.4     Recent Labs   Lab Test 03/31/23  1239 02/01/22  0730 10/14/20  0717   CR 0.80 0.77 0.81     Recent Labs   Lab Test 05/05/23  0823   A1C 5.2          Recent Labs   Lab Test 03/31/23  1239   WBC 7.5   HGB 13.0   HCT 37.1   MCV 90        Recent Labs   Lab Test 03/31/23  1239 02/01/22  0730 10/14/20  0717   HGB 13.0 14.3 14.4    No results for input(s): \"TROPONINI\" in the last 33877 hours.  No results for input(s): \"BNP\", \"NTBNPI\", \"NTBNP\" in the last 66463 " "hours.  Recent Labs   Lab Test 03/31/23  1239   TSH 0.79     No results for input(s): \"INR\" in the last 28322 hours.     Ivon Collier MD              Thank you for allowing me to participate in the care of your patient.      Sincerely,     Ivon Collier MD     [facility]  cc:   Mirella Rojo MD  [address] [facility] [address], [address]  [place] [address]      "

## 2024-02-26 NOTE — PATIENT INSTRUCTIONS
Ms. Maralmaria guadalupe Marino,     It was a pleasure to see you in the office today. My recommendations for you include:   1. Increase losartan to 100 mg daily  2. Will start repatha for your cholesterol     Please do not hesitate to call the [facility] clinic with any questions or concerns at [phone].    Sincerely,     Ivon Collier MD

## 2024-04-05 ENCOUNTER — PATIENT OUTREACH (OUTPATIENT)
Dept: CARE COORDINATION | Facility: CLINIC | Age: 64
End: 2024-04-05
Payer: COMMERCIAL

## 2024-04-09 ENCOUNTER — TELEPHONE (OUTPATIENT)
Dept: CARDIOLOGY | Facility: CLINIC | Age: 64
End: 2024-04-09
Payer: COMMERCIAL

## 2024-04-09 DIAGNOSIS — I25.10 CORONARY ARTERY CALCIFICATION SEEN ON CT SCAN: ICD-10-CM

## 2024-04-09 DIAGNOSIS — E78.2 MIXED HYPERLIPIDEMIA: Primary | ICD-10-CM

## 2024-04-09 DIAGNOSIS — I10 BENIGN ESSENTIAL HYPERTENSION: ICD-10-CM

## 2024-04-09 NOTE — TELEPHONE ENCOUNTER
Noted per Dr. Collier's 2-26-24 visit note:  evolocumab (REPATHA) 140 MG/ML prefilled autoinjector 1 mL 11 2/26/2024 -- No   Sig - Route: Inject 1 mL (140 mg) Subcutaneous every 14 days - Subcutaneous   Sent to pharmacy as: Evolocumab 140 MG/ML Subcutaneous Solution Auto-injector (REPATHA)   Class: E-Prescribe   Order: [id number]   E-Prescribing Status: Receipt confirmed by pharmacy (2/26/2024 10:21 AM CST)     Left ms for patient requesting call back to confirm receipt of Repatha Rx since no PA request was rec'd by writer.

## 2024-04-09 NOTE — TELEPHONE ENCOUNTER
----- Message from Ivon Collier MD sent at 2/26/2024 10:21 AM CST -----  Starting repatha, hyperlipidemia and cad with statin intolerance.  Can you work on prior auth?

## 2024-04-10 NOTE — TELEPHONE ENCOUNTER
[facility] Call Center    Phone Message    May a detailed message be left on voicemail: yes     Reason for Call: Other: patient is calling and said she has had 3 doses already, please advise,thank you.     Action Taken: Other: cardiology    Travel Screening: Not Applicable  Thank you!  [facility]

## 2024-04-16 NOTE — TELEPHONE ENCOUNTER
"Per Dr. Collier's 2-26-24 visit note:  \"Will start on Repatha with injections every 2 weeks and repeating fasting lipids in a few months time.  Follow-up in a year.\"    Msg sent to sched team to arrange FLP as recommended.  mg  "

## 2024-04-19 ENCOUNTER — PATIENT OUTREACH (OUTPATIENT)
Dept: CARE COORDINATION | Facility: CLINIC | Age: 64
End: 2024-04-19
Payer: COMMERCIAL

## 2024-04-24 DIAGNOSIS — I10 HYPERTENSION, UNSPECIFIED TYPE: ICD-10-CM

## 2024-04-24 DIAGNOSIS — I10 BENIGN ESSENTIAL HYPERTENSION: ICD-10-CM

## 2024-04-24 NOTE — TELEPHONE ENCOUNTER
Left message to call back for: patient x1  Information to relay to patient: Schedule per provider below.

## 2024-04-28 RX ORDER — HYDROCHLOROTHIAZIDE 12.5 MG/1
CAPSULE ORAL
Qty: 90 CAPSULE | Refills: 0 | OUTPATIENT
Start: 2024-04-28

## 2024-05-02 ENCOUNTER — MYC REFILL (OUTPATIENT)
Dept: FAMILY MEDICINE | Facility: CLINIC | Age: 64
End: 2024-05-02
Payer: COMMERCIAL

## 2024-05-02 DIAGNOSIS — I10 HYPERTENSION, UNSPECIFIED TYPE: ICD-10-CM

## 2024-05-02 DIAGNOSIS — I10 BENIGN ESSENTIAL HYPERTENSION: ICD-10-CM

## 2024-05-02 NOTE — TELEPHONE ENCOUNTER
Noted sched has left msgs on 4-16-24 and 4-23-24 - lab appt still pending scheduling - msg sent to sched team to make one more attempt.  mg

## 2024-05-03 NOTE — TELEPHONE ENCOUNTER
She is due for an annual exam May 5, 2024 along with labs that would be different determined at that appointment.  Follow-up is needed.  Okay to bridge this medication until she can get in.

## 2024-05-06 RX ORDER — HYDROCHLOROTHIAZIDE 12.5 MG/1
CAPSULE ORAL
Qty: 30 CAPSULE | Refills: 0 | Status: SHIPPED | OUTPATIENT
Start: 2024-05-06 | End: 2024-06-22 | Stop reason: DRUGHIGH

## 2024-05-06 ASSESSMENT — ANXIETY QUESTIONNAIRES
GAD7 TOTAL SCORE: 12
1. FEELING NERVOUS, ANXIOUS, OR ON EDGE: NEARLY EVERY DAY
7. FEELING AFRAID AS IF SOMETHING AWFUL MIGHT HAPPEN: SEVERAL DAYS
8. IF YOU CHECKED OFF ANY PROBLEMS, HOW DIFFICULT HAVE THESE MADE IT FOR YOU TO DO YOUR WORK, TAKE CARE OF THINGS AT HOME, OR GET ALONG WITH OTHER PEOPLE?: SOMEWHAT DIFFICULT
2. NOT BEING ABLE TO STOP OR CONTROL WORRYING: NEARLY EVERY DAY
IF YOU CHECKED OFF ANY PROBLEMS ON THIS QUESTIONNAIRE, HOW DIFFICULT HAVE THESE PROBLEMS MADE IT FOR YOU TO DO YOUR WORK, TAKE CARE OF THINGS AT HOME, OR GET ALONG WITH OTHER PEOPLE: SOMEWHAT DIFFICULT
GAD7 TOTAL SCORE: 12
5. BEING SO RESTLESS THAT IT IS HARD TO SIT STILL: NOT AT ALL
6. BECOMING EASILY ANNOYED OR IRRITABLE: SEVERAL DAYS
7. FEELING AFRAID AS IF SOMETHING AWFUL MIGHT HAPPEN: SEVERAL DAYS
GAD7 TOTAL SCORE: 12
4. TROUBLE RELAXING: SEVERAL DAYS
3. WORRYING TOO MUCH ABOUT DIFFERENT THINGS: NEARLY EVERY DAY

## 2024-05-07 ENCOUNTER — OFFICE VISIT (OUTPATIENT)
Dept: FAMILY MEDICINE | Facility: CLINIC | Age: 64
End: 2024-05-07
Payer: COMMERCIAL

## 2024-05-07 VITALS
OXYGEN SATURATION: 98 % | HEIGHT: 63 IN | SYSTOLIC BLOOD PRESSURE: 144 MMHG | WEIGHT: 144.9 LBS | RESPIRATION RATE: 16 BRPM | BODY MASS INDEX: 25.68 KG/M2 | HEART RATE: 83 BPM | TEMPERATURE: 97.8 F | DIASTOLIC BLOOD PRESSURE: 80 MMHG

## 2024-05-07 DIAGNOSIS — Z00.00 HEALTH CARE MAINTENANCE: ICD-10-CM

## 2024-05-07 DIAGNOSIS — E78.2 MIXED HYPERLIPIDEMIA: Chronic | ICD-10-CM

## 2024-05-07 DIAGNOSIS — I10 BENIGN ESSENTIAL HYPERTENSION: Primary | ICD-10-CM

## 2024-05-07 PROCEDURE — 36415 COLL VENOUS BLD VENIPUNCTURE: CPT | Performed by: FAMILY MEDICINE

## 2024-05-07 PROCEDURE — 99214 OFFICE O/P EST MOD 30 MIN: CPT | Mod: 25 | Performed by: FAMILY MEDICINE

## 2024-05-07 PROCEDURE — 90677 PCV20 VACCINE IM: CPT | Performed by: FAMILY MEDICINE

## 2024-05-07 PROCEDURE — 90471 IMMUNIZATION ADMIN: CPT | Performed by: FAMILY MEDICINE

## 2024-05-07 PROCEDURE — 80048 BASIC METABOLIC PNL TOTAL CA: CPT | Performed by: FAMILY MEDICINE

## 2024-05-07 RX ORDER — HYDROCHLOROTHIAZIDE 25 MG/1
25 TABLET ORAL DAILY
Qty: 90 TABLET | Refills: 0 | Status: SHIPPED | OUTPATIENT
Start: 2024-05-07 | End: 2024-08-06

## 2024-05-07 NOTE — PROGRESS NOTES
"  Assessment & Plan   Problem List Items Addressed This Visit          Endocrine    Mixed hyperlipidemia (Chronic)     No longer taking lipitor, now on repatha - cardiology is managing.             Circulatory    Benign essential hypertension - Primary     Hypertension, still uncontrolled.     Increase hydrochlorothiazide 12.5 to 25 mg today.   Continue cozaar 100mg po q day.   Nurse follow up and if bp still runs consistently over 130/80 would consider adding norvasc 2.5 mg po  q day.   Recommend recheck bmp today and at follow up.  Plan nurse visit for follow up bp.         Relevant Medications    hydrochlorothiazide (HYDRODIURIL) 25 MG tablet    Other Relevant Orders    Basic metabolic panel  (Ca, Cl, CO2, Creat, Gluc, K, Na, BUN)    Basic metabolic panel  (Ca, Cl, CO2, Creat, Gluc, K, Na, BUN)       Other    Health care maintenance     Will do pneumo 20 vax today.  Mammo planned next week.               BMI  Estimated body mass index is 26.08 kg/m  as calculated from the following:    Height as of this encounter: 1.588 m (5' 2.5\").    Weight as of this encounter: 65.7 kg (144 lb 14.4 oz).     Danilo Alicia is a 64 year old, presenting for the following health issues:  Hypertension    History of Present Illness       Mental Health Follow-up:  Patient presents to follow-up on Depression & Anxiety.Patient's depression since last visit has been:  No change  The patient is having other symptoms associated with depression.  Patient's anxiety since last visit has been:  No change  The patient is having other symptoms associated with anxiety.  Any significant life events: relationship concerns and grief or loss  Patient is feeling anxious or having panic attacks.  Patient has no concerns about alcohol or drug use.    Hypertension: She presents for follow up of hypertension.  She does check blood pressure  regularly outside of the clinic. Outside blood pressures have been over 140/90. She does not follow a low salt " "diet.     She eats 4 or more servings of fruits and vegetables daily.She consumes 0 sweetened beverage(s) daily.She exercises with enough effort to increase her heart rate 20 to 29 minutes per day.  She exercises with enough effort to increase her heart rate 4 days per week.   She is taking medications regularly.         Objective    BP (!) 165/93 (BP Location: Left arm, Patient Position: Left side, Cuff Size: Adult Regular)   Pulse 83   Temp 97.8  F (36.6  C) (Oral)   Resp 16   Ht 1.588 m (5' 2.5\")   Wt 65.7 kg (144 lb 14.4 oz)   SpO2 98%   BMI 26.08 kg/m    Body mass index is 26.08 kg/m .  Physical Exam  Constitutional:       Appearance: Normal appearance.   HENT:      Head: Normocephalic and atraumatic.   Cardiovascular:      Rate and Rhythm: Normal rate and regular rhythm.   Pulmonary:      Effort: Pulmonary effort is normal.   Musculoskeletal:         General: Normal range of motion.      Cervical back: Normal range of motion and neck supple.   Neurological:      General: No focal deficit present.      Mental Status: She is alert and oriented to person, place, and time.                  Signed Electronically by: Claudette Nowak MD    "

## 2024-05-07 NOTE — TELEPHONE ENCOUNTER
"Copied from lab order notes \"5-7-24 pt [facility] to have this done~caa\".  mg  "

## 2024-05-07 NOTE — ASSESSMENT & PLAN NOTE
Hypertension, still uncontrolled.     Increase hydrochlorothiazide 12.5 to 25 mg today.   Continue cozaar 100mg po q day.   Nurse follow up and if bp still runs consistently over 130/80 would consider adding norvasc 2.5 mg po  q day.   Recommend recheck bmp today and at follow up.  Plan nurse visit for follow up bp.

## 2024-05-08 LAB
ANION GAP SERPL CALCULATED.3IONS-SCNC: 6 MMOL/L (ref 7–15)
BUN SERPL-MCNC: 23.3 MG/DL (ref 8–23)
CALCIUM SERPL-MCNC: 10.4 MG/DL (ref 8.8–10.2)
CHLORIDE SERPL-SCNC: 102 MMOL/L (ref 98–107)
CREAT SERPL-MCNC: 0.94 MG/DL (ref 0.51–0.95)
DEPRECATED HCO3 PLAS-SCNC: 31 MMOL/L (ref 22–29)
EGFRCR SERPLBLD CKD-EPI 2021: 67 ML/MIN/1.73M2
GLUCOSE SERPL-MCNC: 88 MG/DL (ref 70–99)
POTASSIUM SERPL-SCNC: 4 MMOL/L (ref 3.4–5.3)
SODIUM SERPL-SCNC: 139 MMOL/L (ref 135–145)

## 2024-06-12 ENCOUNTER — LAB (OUTPATIENT)
Dept: LAB | Facility: CLINIC | Age: 64
End: 2024-06-12
Payer: COMMERCIAL

## 2024-06-12 DIAGNOSIS — I10 BENIGN ESSENTIAL HYPERTENSION: ICD-10-CM

## 2024-06-12 DIAGNOSIS — I25.10 CORONARY ARTERY CALCIFICATION SEEN ON CT SCAN: ICD-10-CM

## 2024-06-12 DIAGNOSIS — E78.2 MIXED HYPERLIPIDEMIA: ICD-10-CM

## 2024-06-12 LAB
ANION GAP SERPL CALCULATED.3IONS-SCNC: 11 MMOL/L (ref 7–15)
BUN SERPL-MCNC: 13.7 MG/DL (ref 8–23)
CALCIUM SERPL-MCNC: 9.8 MG/DL (ref 8.8–10.2)
CHLORIDE SERPL-SCNC: 103 MMOL/L (ref 98–107)
CHOLEST SERPL-MCNC: 166 MG/DL
CREAT SERPL-MCNC: 0.78 MG/DL (ref 0.51–0.95)
DEPRECATED HCO3 PLAS-SCNC: 25 MMOL/L (ref 22–29)
EGFRCR SERPLBLD CKD-EPI 2021: 84 ML/MIN/1.73M2
FASTING STATUS PATIENT QL REPORTED: YES
GLUCOSE SERPL-MCNC: 108 MG/DL (ref 70–99)
HDLC SERPL-MCNC: 64 MG/DL
LDLC SERPL CALC-MCNC: 55 MG/DL
NONHDLC SERPL-MCNC: 102 MG/DL
POTASSIUM SERPL-SCNC: 4.1 MMOL/L (ref 3.4–5.3)
SODIUM SERPL-SCNC: 139 MMOL/L (ref 135–145)
TRIGL SERPL-MCNC: 236 MG/DL

## 2024-06-12 PROCEDURE — 80048 BASIC METABOLIC PNL TOTAL CA: CPT

## 2024-06-12 PROCEDURE — 36415 COLL VENOUS BLD VENIPUNCTURE: CPT

## 2024-06-12 PROCEDURE — 80061 LIPID PANEL: CPT

## 2024-06-19 ENCOUNTER — MYC MEDICAL ADVICE (OUTPATIENT)
Dept: FAMILY MEDICINE | Facility: CLINIC | Age: 64
End: 2024-06-19
Payer: COMMERCIAL

## 2024-06-19 DIAGNOSIS — I10 BENIGN ESSENTIAL HYPERTENSION: Primary | ICD-10-CM

## 2024-06-19 PROCEDURE — 99213 OFFICE O/P EST LOW 20 MIN: CPT | Performed by: FAMILY MEDICINE

## 2024-06-19 NOTE — TELEPHONE ENCOUNTER
OV on 5/7/24 noted:  Circulatory      Benign essential hypertension - Primary       Hypertension, still uncontrolled.      Increase hydrochlorothiazide 12.5 to 25 mg today.   Continue cozaar 100mg po q day.   Nurse follow up and if bp still runs consistently over 130/80 would consider adding norvasc 2.5 mg po  q day.   Recommend recheck bmp today and at follow up.  Plan nurse visit for follow up bp.           Relevant Medications     hydrochlorothiazide (HYDRODIURIL) 25 MG tablet

## 2024-06-22 RX ORDER — AMLODIPINE BESYLATE 2.5 MG/1
2.5 TABLET ORAL DAILY
Qty: 30 TABLET | Refills: 1 | Status: SHIPPED | OUTPATIENT
Start: 2024-06-22 | End: 2024-08-20 | Stop reason: DRUGHIGH

## 2024-06-22 NOTE — ASSESSMENT & PLAN NOTE
"[employer] message sent 6/22/2024   \"Torres Alicia.   I see you continue to have blood pressures that are higher than desired.    I would be happy to add the norvasc 2.5 mg dose that we discussed.     We should get blood work now and see you again in one month to further evaluate.    Can you come in for blood work this week to make sure the med changes we made last time are not causing electrolyte abnormalites?  Orders and follow up ticket have been placed to make blood draw scheduling easier.    I will also send over norvasc 2.5 mg daily to your pharmacy to add on to your current treatment regimine. And I will place another follow up ticket so that you can come in for blood pressure follow up when you will again be due for blood work (due to changing meds again right now), this can be done as a nurse visit and if still elevated we can increase norvasc from 2.5 to 5mg dose (I prefer to start low and increase if needed).     Claudette Nowak MD \"  "

## 2024-07-02 ENCOUNTER — MYC MEDICAL ADVICE (OUTPATIENT)
Dept: FAMILY MEDICINE | Facility: CLINIC | Age: 64
End: 2024-07-02
Payer: COMMERCIAL

## 2024-07-23 ENCOUNTER — ALLIED HEALTH/NURSE VISIT (OUTPATIENT)
Dept: FAMILY MEDICINE | Facility: CLINIC | Age: 64
End: 2024-07-23
Attending: FAMILY MEDICINE
Payer: COMMERCIAL

## 2024-07-23 VITALS — SYSTOLIC BLOOD PRESSURE: 158 MMHG | DIASTOLIC BLOOD PRESSURE: 93 MMHG | HEART RATE: 80 BPM | OXYGEN SATURATION: 100 %

## 2024-07-23 DIAGNOSIS — I10 BENIGN ESSENTIAL HYPERTENSION: ICD-10-CM

## 2024-07-23 PROCEDURE — 99207 PR NO CHARGE NURSE ONLY: CPT

## 2024-07-23 RX ORDER — HYDROCHLOROTHIAZIDE 25 MG/1
25 TABLET ORAL DAILY
Qty: 90 TABLET | Refills: 0 | Status: CANCELLED | OUTPATIENT
Start: 2024-07-23

## 2024-07-23 NOTE — PROGRESS NOTES
"Note from 6/19 [employer] message-   \"I will also send over norvasc 2.5 mg daily to your pharmacy to add on to your current treatment regimine. And I will place another follow up ticket so that you can come in for blood pressure follow up when you will again be due for blood work (due to changing meds again right now), this can be done as a nurse visit and if still elevated we can increase norvasc from 2.5 to 5mg dose (I prefer to start low and increase if needed).   Claudette Nowak MD \"      I met with Maral Marino at the request of Dr. Nowak to recheck her blood pressure.  Blood pressure medications on the med list were reviewed with patient.    Patient has taken all medications as per usual regimen: Yes  Patient reports tolerating them without any issues or concerns: Yes    Vitals:    07/23/24 1033 07/23/24 1043   BP: (!) 177/98 (!) 158/93   BP Location: Left arm Left arm   Patient Position: Sitting Sitting   Cuff Size: Adult Regular Adult Regular   Pulse: 80    SpO2: 100%        After 5 minutes, the patient's blood pressure remained greater than or equal to 140/90.    Is the patient currently having any chest pain? No  Does the patient currently have a headache? No  Does the patient currently have any vision changes? No  Does the patient currently have any nausea? No  Does the patient currently have any abdominal pain? No    Patient brought in a few home blood pressure readings and these were added to patient reported vitals flowsheet.    The previous encounter was reviewed.  The patient was discharged and the note will be sent to the provider for final review.    "

## 2024-07-24 ENCOUNTER — TELEPHONE (OUTPATIENT)
Dept: FAMILY MEDICINE | Facility: CLINIC | Age: 64
End: 2024-07-24
Payer: COMMERCIAL

## 2024-07-24 NOTE — TELEPHONE ENCOUNTER
Claudette Nowak MD at 7/23/2024 10:30 AM    Status: Signed   Last bp was still high, follow up needed. Ok to bridge      Kenton Vincent RN at 7/23/2024 10:30 AM    Status: Signed   Left message to call back for: pt  Information to relay to patient: see providers message below and relay. Assist with scheduling and route back to provider so hydrochlorothiazide bridge can be provided to patient.

## 2024-07-24 NOTE — PROGRESS NOTES
Left message to call back for: pt  Information to relay to patient: see providers message below and relay. Assist with scheduling and route back to provider so hydrochlorothiazide bridge can be provided to patient.

## 2024-07-31 DIAGNOSIS — I10 BENIGN ESSENTIAL HYPERTENSION: ICD-10-CM

## 2024-08-03 ENCOUNTER — HEALTH MAINTENANCE LETTER (OUTPATIENT)
Age: 64
End: 2024-08-03

## 2024-08-06 RX ORDER — HYDROCHLOROTHIAZIDE 25 MG/1
25 TABLET ORAL DAILY
Qty: 30 TABLET | Refills: 0 | Status: SHIPPED | OUTPATIENT
Start: 2024-08-06 | End: 2024-08-30

## 2024-08-06 NOTE — TELEPHONE ENCOUNTER
Repeat blood pressure check needed to ensure the new medication is working, limited supply sent. Can do nurse visit for bp check.

## 2024-08-06 NOTE — TELEPHONE ENCOUNTER
Left message to call back for: Patient  Information to relay to patient: Please see provider message below and help patient schedule BP check

## 2024-08-14 DIAGNOSIS — I10 BENIGN ESSENTIAL HYPERTENSION: ICD-10-CM

## 2024-08-14 NOTE — TELEPHONE ENCOUNTER
Last recorded blood pressure was elevated.  Needs follow-up.  Please schedule and bridge if needed.

## 2024-08-14 NOTE — TELEPHONE ENCOUNTER
Left message to call back for: Patient  Information to relay to patient: See provider message below and help patient schedule. Please route back to provider if bridge refill is needed.

## 2024-08-16 NOTE — TELEPHONE ENCOUNTER
Left message to call back for: Patient  Information to relay to patient: See provider message below and assist patient with scheduling. Please route back to provider if bridge refill is needed

## 2024-08-19 ENCOUNTER — MYC MEDICAL ADVICE (OUTPATIENT)
Dept: FAMILY MEDICINE | Facility: CLINIC | Age: 64
End: 2024-08-19
Payer: COMMERCIAL

## 2024-08-19 ENCOUNTER — HOSPITAL ENCOUNTER (EMERGENCY)
Facility: CLINIC | Age: 64
Discharge: HOME | End: 2024-08-19
Payer: COMMERCIAL

## 2024-08-19 VITALS
WEIGHT: 145 LBS | DIASTOLIC BLOOD PRESSURE: 99 MMHG | HEIGHT: 62 IN | OXYGEN SATURATION: 100 % | SYSTOLIC BLOOD PRESSURE: 196 MMHG | BODY MASS INDEX: 26.68 KG/M2 | HEART RATE: 83 BPM | TEMPERATURE: 97.6 F | RESPIRATION RATE: 18 BRPM

## 2024-08-19 DIAGNOSIS — Z20.9 EXPOSURE TO BAT WITHOUT KNOWN BITE: ICD-10-CM

## 2024-08-19 DIAGNOSIS — Z51.81 ENCOUNTER FOR THERAPEUTIC DRUG MONITORING: ICD-10-CM

## 2024-08-19 DIAGNOSIS — I10 BENIGN ESSENTIAL HYPERTENSION: Primary | ICD-10-CM

## 2024-08-19 PROCEDURE — 90472 IMMUNIZATION ADMIN EACH ADD: CPT

## 2024-08-19 PROCEDURE — 250N000011 HC RX IP 250 OP 636

## 2024-08-19 PROCEDURE — 90675 RABIES VACCINE IM: CPT

## 2024-08-19 PROCEDURE — 90715 TDAP VACCINE 7 YRS/> IM: CPT

## 2024-08-19 PROCEDURE — 90471 IMMUNIZATION ADMIN: CPT

## 2024-08-19 PROCEDURE — 96372 THER/PROPH/DIAG INJ SC/IM: CPT

## 2024-08-19 PROCEDURE — 90375 RABIES IG IM/SC: CPT

## 2024-08-19 PROCEDURE — 99284 EMERGENCY DEPT VISIT MOD MDM: CPT | Mod: 25

## 2024-08-19 RX ADMIN — CLOSTRIDIUM TETANI TOXOID ANTIGEN (FORMALDEHYDE INACTIVATED), CORYNEBACTERIUM DIPHTHERIAE TOXOID ANTIGEN (FORMALDEHYDE INACTIVATED), BORDETELLA PERTUSSIS TOXOID ANTIGEN (GLUTARALDEHYDE INACTIVATED), BORDETELLA PERTUSSIS FILAMENTOUS HEMAGGLUTININ ANTIGEN (FORMALDEHYDE INACTIVATED), BORDETELLA PERTUSSIS PERTACTIN ANTIGEN, AND BORDETELLA PERTUSSIS FIMBRIAE 2/3 ANTIGEN 0.5 ML: 5; 2; 2.5; 5; 3; 5 INJECTION, SUSPENSION INTRAMUSCULAR at 10:04

## 2024-08-19 RX ADMIN — RABIES VACCINE 1 ML: KIT at 10:07

## 2024-08-19 RX ADMIN — RABIES IMMUNE GLOBULIN (HUMAN) 1320 UNITS: 300 INJECTION, SOLUTION INFILTRATION; INTRAMUSCULAR at 10:10

## 2024-08-19 ASSESSMENT — COLUMBIA-SUICIDE SEVERITY RATING SCALE - C-SSRS
2. HAVE YOU ACTUALLY HAD ANY THOUGHTS OF KILLING YOURSELF IN THE PAST MONTH?: NO
6. HAVE YOU EVER DONE ANYTHING, STARTED TO DO ANYTHING, OR PREPARED TO DO ANYTHING TO END YOUR LIFE?: NO
1. IN THE PAST MONTH, HAVE YOU WISHED YOU WERE DEAD OR WISHED YOU COULD GO TO SLEEP AND NOT WAKE UP?: NO

## 2024-08-19 ASSESSMENT — ACTIVITIES OF DAILY LIVING (ADL): ADLS_ACUITY_SCORE: 35
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
## 2024-08-19 NOTE — DISCHARGE INSTRUCTIONS
You were seen in the ER for evaluation after potential rabies exposure.  Your tetanus was updated.  Please follow the instructions below to complete the recommended Rabies vaccination schedule.      Today is day 0 and you will need repeat vaccine on day 3, 7 and 14.                                                           RABIES INFO  To prevent rabies, you must get 3 more shots of the rabies vaccine. If you don't, you may still develop rabies.     You will need an extra shot on Day-3, Day-7, and Day-14. Please follow the steps below.     Day 0 is the day you got your first rabies shot.   If Day 0 is Wednesday: Return to the Emergency Department on Saturday for your Day-3 shot. Then, get your Day-7 and Day-14 shots at one of the clinics listed below.  If Day 0 is Thursday: Return to the Emergency Department on Sunday for your Day-3 shot. Then, get your Day-7 and Day-14 shots at one of the clinics listed below.  If Day 0 is any other day, get all three follow-up shots (Days 3, 7, and 14) at one of the clinics listed below.   Call to schedule the rest of your shots at one of these primary care clinics: [facility] or [facility].  Call [phone] (our Scheduling Center) Monday-Friday, 7 a.m. to 5 p.m.  Ask them to connect you to the Primary Care Team at the [facility].   Once you're connected to the primary care team representative, tell them that you need to schedule your rabies shots with the medical assistant.    If you can't schedule your shots--or you need a shot on a weekend (Saturday or Sunday)--come back to the Emergency Department to get your shot. There may be a long wait in the Emergency Department.
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
## 2024-08-20 RX ORDER — AMLODIPINE BESYLATE 5 MG/1
5 TABLET ORAL DAILY
Qty: 30 TABLET | Refills: 1 | Status: SHIPPED | OUTPATIENT
Start: 2024-08-20 | End: 2024-09-17 | Stop reason: DRUGHIGH

## 2024-08-20 RX ORDER — AMLODIPINE BESYLATE 2.5 MG/1
2.5 TABLET ORAL DAILY
Qty: 30 TABLET | Refills: 1 | OUTPATIENT
Start: 2024-08-20

## 2024-08-22 ENCOUNTER — ALLIED HEALTH/NURSE VISIT (OUTPATIENT)
Dept: PEDIATRICS | Facility: CLINIC | Age: 64
End: 2024-08-22
Payer: COMMERCIAL

## 2024-08-22 DIAGNOSIS — Z20.9 EXPOSURE TO BAT WITHOUT KNOWN BITE: Primary | ICD-10-CM

## 2024-08-22 PROCEDURE — 90675 RABIES VACCINE IM: CPT

## 2024-08-22 PROCEDURE — 90471 IMMUNIZATION ADMIN: CPT

## 2024-08-26 ENCOUNTER — ALLIED HEALTH/NURSE VISIT (OUTPATIENT)
Dept: PEDIATRICS | Facility: CLINIC | Age: 64
End: 2024-08-26
Payer: COMMERCIAL

## 2024-08-26 DIAGNOSIS — Z20.9 EXPOSURE TO BAT WITHOUT KNOWN BITE: Primary | ICD-10-CM

## 2024-08-26 PROCEDURE — 90675 RABIES VACCINE IM: CPT

## 2024-08-26 PROCEDURE — 90471 IMMUNIZATION ADMIN: CPT

## 2024-08-30 DIAGNOSIS — I10 BENIGN ESSENTIAL HYPERTENSION: ICD-10-CM

## 2024-08-30 RX ORDER — HYDROCHLOROTHIAZIDE 25 MG/1
25 TABLET ORAL DAILY
Qty: 30 TABLET | Refills: 0 | Status: SHIPPED | OUTPATIENT
Start: 2024-08-30 | End: 2024-09-27

## 2024-09-03 ENCOUNTER — ALLIED HEALTH/NURSE VISIT (OUTPATIENT)
Dept: PEDIATRICS | Facility: CLINIC | Age: 64
End: 2024-09-03
Payer: COMMERCIAL

## 2024-09-03 DIAGNOSIS — Z20.9 EXPOSURE TO BAT WITHOUT KNOWN BITE: Primary | ICD-10-CM

## 2024-09-03 PROCEDURE — 90471 IMMUNIZATION ADMIN: CPT

## 2024-09-03 PROCEDURE — 90675 RABIES VACCINE IM: CPT

## 2024-09-17 ENCOUNTER — ANCILLARY PROCEDURE (OUTPATIENT)
Dept: GENERAL RADIOLOGY | Facility: CLINIC | Age: 64
End: 2024-09-17
Attending: FAMILY MEDICINE
Payer: COMMERCIAL

## 2024-09-17 ENCOUNTER — MYC MEDICAL ADVICE (OUTPATIENT)
Dept: FAMILY MEDICINE | Facility: CLINIC | Age: 64
End: 2024-09-17

## 2024-09-17 ENCOUNTER — OFFICE VISIT (OUTPATIENT)
Dept: FAMILY MEDICINE | Facility: CLINIC | Age: 64
End: 2024-09-17
Attending: FAMILY MEDICINE
Payer: COMMERCIAL

## 2024-09-17 VITALS
HEART RATE: 74 BPM | OXYGEN SATURATION: 99 % | BODY MASS INDEX: 26.5 KG/M2 | RESPIRATION RATE: 12 BRPM | TEMPERATURE: 98 F | WEIGHT: 144 LBS | SYSTOLIC BLOOD PRESSURE: 159 MMHG | DIASTOLIC BLOOD PRESSURE: 83 MMHG | HEIGHT: 62 IN

## 2024-09-17 DIAGNOSIS — M79.671 RIGHT FOOT PAIN: Primary | ICD-10-CM

## 2024-09-17 DIAGNOSIS — M79.671 RIGHT FOOT PAIN: ICD-10-CM

## 2024-09-17 DIAGNOSIS — I10 BENIGN ESSENTIAL HYPERTENSION: ICD-10-CM

## 2024-09-17 DIAGNOSIS — E87.6 HYPOKALEMIA: ICD-10-CM

## 2024-09-17 PROCEDURE — 73630 X-RAY EXAM OF FOOT: CPT | Mod: TC | Performed by: RADIOLOGY

## 2024-09-17 PROCEDURE — 80048 BASIC METABOLIC PNL TOTAL CA: CPT | Performed by: FAMILY MEDICINE

## 2024-09-17 PROCEDURE — 99214 OFFICE O/P EST MOD 30 MIN: CPT | Performed by: FAMILY MEDICINE

## 2024-09-17 PROCEDURE — 36415 COLL VENOUS BLD VENIPUNCTURE: CPT | Performed by: FAMILY MEDICINE

## 2024-09-17 RX ORDER — AMLODIPINE BESYLATE 10 MG/1
10 TABLET ORAL DAILY
Qty: 30 TABLET | Refills: 1 | Status: SHIPPED | OUTPATIENT
Start: 2024-09-17

## 2024-09-17 NOTE — ASSESSMENT & PLAN NOTE
Htn - poorly controlled and seems resistant to treatment.   Continue hydrochlorothiazide 25 mg po q day  Continue losartan 100mg po q day  And increase norvasc from 5mg po q day to 10 mg poq day.   Bmp today.   Follow up in 1 month and recommend consult with cardiology Dr. Collier to see what she recommends.

## 2024-09-17 NOTE — ASSESSMENT & PLAN NOTE
Right foot pain x 8 days. The day it started she was golfing and walking a lot and she noticed it after 9 holes. No trauma or inciting event. She is not sure how it happened.     On exam pain noted in lateral right foot over 5th metatarsal.   Gait favors right leg.     Podiatry consult ordered.

## 2024-09-17 NOTE — PROGRESS NOTES
Problem List Items Addressed This Visit          Nervous and Auditory    Right foot pain - Primary     Right foot pain x 8 days. The day it started she was golfing and walking a lot and she noticed it after 9 holes. No trauma or inciting event. She is not sure how it happened.     On exam pain noted in lateral right foot over 5th metatarsal.   Gait favors right leg.     Podiatry consult ordered.          Relevant Orders    XR Foot Right G/E 3 Views (Completed)    Orthopedic  Referral       Endocrine    Hypokalemia     Addendum 9/18/2024 10:06 AM   New mild hypokalemia.  Will recheck her potassium in the next few days.  She has been instructed to eat potassium rich foods we will need to keep an eye on this with her blood pressure medication adjustments.         Relevant Orders    Potassium       Circulatory    Benign essential hypertension     Htn - poorly controlled and seems resistant to treatment.   Continue hydrochlorothiazide 25 mg po q day  Continue losartan 100mg po q day  And increase norvasc from 5mg po q day to 10 mg poq day.   Bmp today.   Follow up in 1 month and recommend consult with cardiology Dr. Collier to see what she recommends.          Relevant Medications    amLODIPine (NORVASC) 10 MG tablet    Other Relevant Orders    Basic metabolic panel  (Ca, Cl, CO2, Creat, Gluc, K, Na, BUN) (Completed)        Danilo Alicia is a 64 year old, presenting for the following health issues:  Hypertension and right foot pain    History of Present Illness       Hypertension: She presents for follow up of hypertension.  She does check blood pressure  regularly outside of the clinic. Outside blood pressures have been over 140/90. She does not follow a low salt diet.     She eats 4 or more servings of fruits and vegetables daily.She consumes 0 sweetened beverage(s) daily.She exercises with enough effort to increase her heart rate 30 to 60 minutes per day.  She exercises with enough effort to increase her  "heart rate 4 days per week.   She is taking medications regularly.           Objective    BP (!) 159/83 (BP Location: Left arm, Patient Position: Left side, Cuff Size: Adult Regular)   Pulse 74   Temp 98  F (36.7  C) (Oral)   Resp 12   Ht 1.575 m (5' 2\")   Wt 65.3 kg (144 lb)   SpO2 99%   BMI 26.34 kg/m    Body mass index is 26.34 kg/m .  Physical Exam  Constitutional:       Appearance: Normal appearance.   HENT:      Head: Normocephalic and atraumatic.   Cardiovascular:      Rate and Rhythm: Normal rate and regular rhythm.   Pulmonary:      Effort: Pulmonary effort is normal.   Musculoskeletal:         General: Normal range of motion.      Cervical back: Normal range of motion and neck supple.        Feet:    Feet:      Comments: Pain swelling and tender to touch over the lateral right foot.     Gait favors right foot.   Neurological:      General: No focal deficit present.      Mental Status: She is alert and oriented to person, place, and time.                Signed Electronically by: Claudette Nowak MD    "

## 2024-09-18 ENCOUNTER — PATIENT OUTREACH (OUTPATIENT)
Dept: CARE COORDINATION | Facility: CLINIC | Age: 64
End: 2024-09-18
Payer: COMMERCIAL

## 2024-09-18 PROBLEM — E87.6 HYPOKALEMIA: Status: ACTIVE | Noted: 2024-09-18

## 2024-09-18 LAB
ANION GAP SERPL CALCULATED.3IONS-SCNC: 14 MMOL/L (ref 7–15)
BUN SERPL-MCNC: 20.3 MG/DL (ref 8–23)
CALCIUM SERPL-MCNC: 9.8 MG/DL (ref 8.8–10.4)
CHLORIDE SERPL-SCNC: 103 MMOL/L (ref 98–107)
CREAT SERPL-MCNC: 0.79 MG/DL (ref 0.51–0.95)
EGFRCR SERPLBLD CKD-EPI 2021: 83 ML/MIN/1.73M2
GLUCOSE SERPL-MCNC: 100 MG/DL (ref 70–99)
HCO3 SERPL-SCNC: 24 MMOL/L (ref 22–29)
POTASSIUM SERPL-SCNC: 3.3 MMOL/L (ref 3.4–5.3)
SODIUM SERPL-SCNC: 141 MMOL/L (ref 135–145)

## 2024-09-18 NOTE — ASSESSMENT & PLAN NOTE
Addendum 9/18/2024 10:06 AM   New mild hypokalemia.  Will recheck her potassium in the next few days.  She has been instructed to eat potassium rich foods we will need to keep an eye on this with her blood pressure medication adjustments.

## 2024-09-19 NOTE — PROGRESS NOTES
ASSESSMENT & PLAN    Maral was seen today for pain.    Diagnoses and all orders for this visit:    Right foot pain  -     Orthopedic  Referral  -     MR Foot Right w/o Contrast; Future  -     Ankle/Foot Bracing Supplies Order Walking Boot; Right; Pneumatic; Tall      This issue is acute and Worsening.    # Right foot pain: Maral Marino  was seen today for right foot pain. Symptoms had been going on for 1.5 weeks. On examination there are positive findings of tenderness, swelling at base of the 5th metatarsal. Imaging findings showed no acute fracture of the 5th metatarsal. Likely cause of patient's condition due to peroneal tendinopathy, however given location of pain and swelling and lack of worsened peroneal pain with resisted strength testing, I recommend getting and MRI to evaluate for 5th metatarsal bone stress injury. Counseled patient on nature of condition and treatment options.     - MRI R foot to evaluate for bone stress injury to crucial watershed area of foot  - Treatment: walking boot  - Medications/Injections: Limited tylenol/ibuprofen for pain for 1-2 weeks    Follow-up: In 2-3 days after MRI, sooner if worsening      Edy Johnson MD  [facility] [place]    -----  Chief Complaint   Patient presents with    Right Foot - Pain       SUBJECTIVE  Maral Marino is a/an 64 year old female who is seen in consultation at the request of  Claudette Nowak M.D. for evaluation of right foot pain.     The patient is seen by themselves.    Onset: 1.5 week(s) ago (date of onset 9/9/24) Patient reports noticing pain after a round of golf. She cannot recall any specific injury. She reports worsening pain and swelling. Initially her foot felt good in the morning, but now she wakes up with pain and swelling. She is walking on her heel to decrease pain.  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     OBJECTIVE:  Ht 1.575 m (5' 2\")   Wt 65.3 kg (144 lb)   BMI 26.34 kg/m     General: healthy, alert and in no distress  Skin: no suspicious lesions or rash.  CV: distal perfusion intact   Resp: normal respiratory effort without conversational dyspnea   Psych: normal mood and affect  Gait: antalgic  Neuro: Normal light sensory exam of right lower extremity    RIGHT FOOT  Inspection:    swelling over the dorsal 5th metatarsal base  Palpation:    Tender about the 5th metatarsal, exquisitely along the base    No tenderness over the peroneal tendon at lateral malleolus, at cuboid  Range of Motion:     Full with pain in alldirections  Strength:    Ankle strength - 5/5 with no worsening of pain in all directions  Special Tests:    Positive: heel strike    Negative: calcaneal squeeze and Lisfranc joint tenderness       RADIOLOGY:  Final results and radiologist's interpretation, available in the [facility] health record.  Images were reviewed with the patient in the office today.  My personal interpretation of the performed imaging:   No obvious fracture of the 5th met. Mild OA of 1st MTP    EXAM: XR FOOT RIGHT G/E 3 VIEWS  LOCATION: [facility]  DATE: 9/17/2024     INDICATION:  Right foot pain  COMPARISON: None.                                                                   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 No acute fracture.       "

## 2024-09-20 ENCOUNTER — OFFICE VISIT (OUTPATIENT)
Dept: ORTHOPEDICS | Facility: CLINIC | Age: 64
End: 2024-09-20
Attending: FAMILY MEDICINE
Payer: COMMERCIAL

## 2024-09-20 VITALS — HEIGHT: 62 IN | BODY MASS INDEX: 26.5 KG/M2 | WEIGHT: 144 LBS

## 2024-09-20 DIAGNOSIS — M79.671 RIGHT FOOT PAIN: ICD-10-CM

## 2024-09-20 PROCEDURE — 99204 OFFICE O/P NEW MOD 45 MIN: CPT | Performed by: STUDENT IN AN ORGANIZED HEALTH CARE EDUCATION/TRAINING PROGRAM

## 2024-09-20 NOTE — PATIENT INSTRUCTIONS
# Right foot pain: Maral Marino  was seen today for right foot pain. Symptoms had been going on for 1.5 weeks. On examination there are positive findings of tenderness, swelling at base of the 5th metatarsal. Imaging findings showed no acute fracture of the 5th metatarsal. Likely cause of patient's condition due to peroneal tendinopathy vs 5th metatarsal bone stress injury. Counseled patient on nature of condition and treatment options.     - MRI R foot to evaluate for bone stress injury to crucial watershed area of foot  - Treatment: walking boot  - Medications/Injections: Limited tylenol/ibuprofen for pain for 1-2 weeks    Follow-up: In 2-3 days after MRI, sooner if worsening    Please call [phone] and ask for my team if you have any questions or concerns.    Pt brought into the ED from another unit in the hospital, was working in the OR, wearing lead for approx 3 hours while standing. Pt reports feeling a back spasm in her lower back causing to her to not be able to walk. Denies any previous back injuries. Pt able to move all extremities. Denies numbness or tingling. Pt rec'd 30mg IM Toradol and 50mg IM Methylprednisolone prior to arriving to ED.

## 2024-09-20 NOTE — LETTER
9/20/2024      Maral Marino  [address]  [place] [address]      Dear Colleague,    Thank you for referring your patient, Maral Marino, to the [facility]. Please see a copy of my visit note below.    ASSESSMENT & PLAN    Maral was seen today for pain.    Diagnoses and all orders for this visit:    Right foot pain  -     Orthopedic  Referral  -     MR Foot Right w/o Contrast; Future  -     Ankle/Foot Bracing Supplies Order Walking Boot; Right; Pneumatic; Tall      This issue is acute and Worsening.    # Right foot pain: Maral Marino  was seen today for right foot pain. Symptoms had been going on for 1.5 weeks. On examination there are positive findings of tenderness, swelling at base of the 5th metatarsal. Imaging findings showed no acute fracture of the 5th metatarsal. Likely cause of patient's condition due to peroneal tendinopathy, however given location of pain and swelling and lack of worsened peroneal pain with resisted strength testing, I recommend getting and MRI to evaluate for 5th metatarsal bone stress injury. Counseled patient on nature of condition and treatment options.     - MRI R foot to evaluate for bone stress injury to crucial watershed area of foot  - Treatment: walking boot  - Medications/Injections: Limited tylenol/ibuprofen for pain for 1-2 weeks    Follow-up: In 2-3 days after MRI, sooner if worsening      Edy Johnson MD  [facility]    -----  Chief Complaint   Patient presents with     Right Foot - Pain       SUBJECTIVE  Maral Marino is a/an 64 year old female who is seen in consultation at the request of  Claudette Nowak M.D. for evaluation of right foot pain.     The patient is seen by themselves.    Onset: 1.5 week(s) ago (date of onset 9/9/24) Patient reports noticing pain after a round of golf. She cannot recall any specific injury. She reports worsening pain and swelling.  "Initially her foot felt good in the morning, but now she wakes up with pain and swelling. She is walking on her heel to decrease pain.  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     OBJECTIVE:  Ht 1.575 m (5' 2\")   Wt 65.3 kg (144 lb)   BMI 26.34 kg/m     General: healthy, alert and in no distress  Skin: no suspicious lesions or rash.  CV: distal perfusion intact   Resp: normal respiratory effort without conversational dyspnea   Psych: normal mood and affect  Gait: antalgic  Neuro: Normal light sensory exam of right lower extremity    RIGHT FOOT  Inspection:    swelling over the dorsal 5th metatarsal base  Palpation:    Tender about the 5th metatarsal, exquisitely along the base    No tenderness over the peroneal tendon at lateral malleolus, at cuboid  Range of Motion:     Full with pain in alldirections  Strength:    Ankle strength - 5/5 with no worsening of pain in all directions  Special Tests:    Positive: heel strike    Negative: calcaneal squeeze and Lisfranc joint tenderness       RADIOLOGY:  Final results and radiologist's interpretation, available in the [facility] health record.  Images were reviewed with the patient in the office today.  My personal interpretation of the performed imaging:   No obvious fracture of the 5th met. Mild OA of 1st MTP    EXAM: XR FOOT RIGHT G/E 3 VIEWS  LOCATION: [facility]  DATE: 9/17/2024     INDICATION:  Right foot pain  COMPARISON: None.                                                                   IMPRESSION: Mild degenerative arthrosis of the first " metatarsophalangeal joint. No acute fracture.           Again, thank you for allowing me to participate in the care of your patient.        Sincerely,        Eyd Johnson MD

## 2024-09-26 DIAGNOSIS — I10 BENIGN ESSENTIAL HYPERTENSION: ICD-10-CM

## 2024-09-27 RX ORDER — HYDROCHLOROTHIAZIDE 25 MG/1
25 TABLET ORAL DAILY
Qty: 30 TABLET | Refills: 0 | Status: SHIPPED | OUTPATIENT
Start: 2024-09-27

## 2024-10-07 ENCOUNTER — MYC MEDICAL ADVICE (OUTPATIENT)
Dept: CARDIOLOGY | Facility: CLINIC | Age: 64
End: 2024-10-07
Payer: COMMERCIAL

## 2024-10-07 ENCOUNTER — MYC REFILL (OUTPATIENT)
Dept: FAMILY MEDICINE | Facility: CLINIC | Age: 64
End: 2024-10-07
Payer: COMMERCIAL

## 2024-10-07 DIAGNOSIS — I10 BENIGN ESSENTIAL HYPERTENSION: ICD-10-CM

## 2024-10-07 RX ORDER — AMLODIPINE BESYLATE 10 MG/1
10 TABLET ORAL DAILY
Qty: 90 TABLET | OUTPATIENT
Start: 2024-10-07

## 2024-10-07 RX ORDER — AMLODIPINE BESYLATE 10 MG/1
10 TABLET ORAL DAILY
Qty: 30 TABLET | Refills: 1 | OUTPATIENT
Start: 2024-10-07

## 2024-10-10 ENCOUNTER — LAB (OUTPATIENT)
Dept: LAB | Facility: CLINIC | Age: 64
End: 2024-10-10
Payer: COMMERCIAL

## 2024-10-10 DIAGNOSIS — I10 BENIGN ESSENTIAL HYPERTENSION: ICD-10-CM

## 2024-10-10 DIAGNOSIS — E87.6 HYPOKALEMIA: ICD-10-CM

## 2024-10-10 LAB
ANION GAP SERPL CALCULATED.3IONS-SCNC: 14 MMOL/L (ref 7–15)
BUN SERPL-MCNC: 22.9 MG/DL (ref 8–23)
CALCIUM SERPL-MCNC: 9.3 MG/DL (ref 8.8–10.4)
CHLORIDE SERPL-SCNC: 102 MMOL/L (ref 98–107)
CREAT SERPL-MCNC: 0.74 MG/DL (ref 0.51–0.95)
EGFRCR SERPLBLD CKD-EPI 2021: 90 ML/MIN/1.73M2
GLUCOSE SERPL-MCNC: 92 MG/DL (ref 70–99)
HCO3 SERPL-SCNC: 22 MMOL/L (ref 22–29)
POTASSIUM SERPL-SCNC: 3.6 MMOL/L (ref 3.4–5.3)
SODIUM SERPL-SCNC: 138 MMOL/L (ref 135–145)

## 2024-10-10 PROCEDURE — 80048 BASIC METABOLIC PNL TOTAL CA: CPT

## 2024-10-10 PROCEDURE — 36415 COLL VENOUS BLD VENIPUNCTURE: CPT

## 2024-10-10 PROCEDURE — 82310 ASSAY OF CALCIUM: CPT

## 2024-10-17 NOTE — TELEPHONE ENCOUNTER
[place] Community Me[place] reviewed.  Patients recent blood pressure is outside of guideline parameters.      Called pt to review, no answer.  Left voicemail message asking patient to check their blood pressure using a home blood pressure cuff or by going to a [facility].  Also left MC message on 10/7/24.    Patient instructed to then call [phone] ([facility]) and leave a message with their name, date of birth, and blood pressure reading that was completed within the last 24 hours and where it was completed.      Will await call back for further review. Thanks    TONYA Bella

## 2024-10-23 DIAGNOSIS — I10 BENIGN ESSENTIAL HYPERTENSION: ICD-10-CM

## 2024-10-23 RX ORDER — HYDROCHLOROTHIAZIDE 25 MG/1
25 TABLET ORAL DAILY
Qty: 30 TABLET | Refills: 0 | Status: SHIPPED | OUTPATIENT
Start: 2024-10-23

## 2024-10-29 VITALS — SYSTOLIC BLOOD PRESSURE: 128 MMHG | DIASTOLIC BLOOD PRESSURE: 78 MMHG

## 2024-10-29 NOTE — TELEPHONE ENCOUNTER
Patient returned call and left voicemail message with update blood pressure reading.      Last Blood Pressure: 159/83  Last Heart Rate: 74  Date: 09/17/24  Location: PCP    Today's Blood Pressure: 128/78   Location: Home BP    Patient reported blood pressure updated in Epic. Blood pressure falls within [place] Community Measures guidelines.  Patient will follow up as previously advised.

## 2024-10-29 NOTE — TELEPHONE ENCOUNTER
[place] Community Measures Blood Pressure guideline reviewed.  Patients recent blood pressure is outside of guideline parameters.       Called pt to review, no answer.  Left  2nd voicemail message asking patient to check their blood pressure using a home blood pressure cuff or by going to a [facility].  Also left  message on 10/7/24. First  left on 10/17/24     Patient instructed to then call [phone] ([facility]) and leave a message with their name, date of birth, and blood pressure reading that was completed within the last 24 hours and where it was completed.       Will await call back for further review. Thanks     TONYA Bella

## 2024-11-01 ENCOUNTER — PATIENT OUTREACH (OUTPATIENT)
Dept: CARE COORDINATION | Facility: CLINIC | Age: 64
End: 2024-11-01
Payer: COMMERCIAL

## 2024-11-04 ENCOUNTER — MYC REFILL (OUTPATIENT)
Dept: FAMILY MEDICINE | Facility: CLINIC | Age: 64
End: 2024-11-04
Payer: COMMERCIAL

## 2024-11-04 DIAGNOSIS — I10 BENIGN ESSENTIAL HYPERTENSION: ICD-10-CM

## 2024-11-04 RX ORDER — AMLODIPINE BESYLATE 10 MG/1
10 TABLET ORAL DAILY
Qty: 30 TABLET | Refills: 2 | Status: SHIPPED | OUTPATIENT
Start: 2024-11-04

## 2024-11-20 DIAGNOSIS — I10 BENIGN ESSENTIAL HYPERTENSION: ICD-10-CM

## 2024-11-21 RX ORDER — HYDROCHLOROTHIAZIDE 25 MG/1
25 TABLET ORAL DAILY
Qty: 90 TABLET | Refills: 2 | Status: SHIPPED | OUTPATIENT
Start: 2024-11-21

## 2024-12-31 DIAGNOSIS — E78.2 MIXED HYPERLIPIDEMIA: ICD-10-CM

## 2024-12-31 DIAGNOSIS — I25.10 CORONARY ARTERY CALCIFICATION SEEN ON CT SCAN: ICD-10-CM

## 2025-01-25 DIAGNOSIS — I10 BENIGN ESSENTIAL HYPERTENSION: ICD-10-CM

## 2025-01-27 RX ORDER — AMLODIPINE BESYLATE 10 MG/1
10 TABLET ORAL DAILY
Qty: 30 TABLET | Refills: 5 | Status: SHIPPED | OUTPATIENT
Start: 2025-01-27

## 2025-02-06 ENCOUNTER — HOSPITAL ENCOUNTER (OUTPATIENT)
Dept: MAMMOGRAPHY | Facility: CLINIC | Age: 65
Discharge: HOME | End: 2025-02-06
Attending: FAMILY MEDICINE
Payer: COMMERCIAL

## 2025-02-06 DIAGNOSIS — Z12.31 VISIT FOR SCREENING MAMMOGRAM: ICD-10-CM

## 2025-02-06 PROCEDURE — 77063 BREAST TOMOSYNTHESIS BI: CPT

## 2025-02-21 NOTE — PROGRESS NOTES
HEART CARE ENCOUNTER NOTE      [facility]  [phone]    Primary Care: Claudette Nowak  Primary Cardiologist: Dr. Collier    Assessment/Recommendations   Assessment:  Coronary artery calcification  CT coronary calcium score 368  Stress echo negative for ischemia in May 2023  Denies anginal symptoms  On aspirin and Repatha  Dyslipidemia  Intolerance to statins  Significant improvement in LDL on Repatha  Hypertension  Uncontrolled, SBP typically 150s  Has been off amlodipine due to leg swelling at 10 mg dose  Discussed trial of amlodipine at 5 mg dosage versus starting spironolactone, patient prefers to avoid amlodipine at this time  Family history of premature coronary artery disease  Father with bypass surgery in his 50s    Plan:  CMP today  Stop amlodipine  Start spironolactone 25 mg daily  Repeat BMP 1 week after starting spironolactone  Monitor BP at home, Regis antonio in 2 weeks with updates  Continue Repatha    Follow up with Dr. Collier in 1 year or sooner if needed.      The longitudinal plan of care for the diagnosis(es)/condition(s) as documented were addressed during this visit. Due to the added complexity in care, I will continue to support Maral in the subsequent management and with ongoing continuity of care.      History of Present Illness/Subjective     Maral Marino is a 64 year old female with PMHx of coronary artery calcifications, dyslipidemia, hypertension, family history of premature CAD presenting for follow-up.      She was last seen by Dr. Collier on 2/26/2024.  At that time, she was establishing care and noted to have intolerances to statin therapy with poorly controlled LDL and poorly controlled hypertension.  She was recommended to start Repatha and increase losartan to 100 mg daily.  She was asked to follow-up in 1 year.    SBP remained uncontrolled and amlodipine was increased to 10 mg.  However this resulted in peripheral edema and was discontinued.  SBP now in the  "150s.  Otherwise no cardiac complaints and feeling well.      Cardiac Testing         Echo stress test, 5/26/2023:  1. Normal stress echocardiogram without evidence of stress induced ischemia.  2. Normal resting LV systolic performance with an ejection fraction of 60%.  There is normal improvement in left ventricular systolic performance with a  peak ejection fraction of 75%.  3. No ECG evidence of ischemia.  4. No anginal chest pain reported with exercise.  5. Good functional capacity for age.      CT coronary calcium score 1/31/2022        Labs:  LDL 55  Potassium 3.6  Creatinine 0.74  A1c 5.2  Hemoglobin 13  Platelet 269      Physical Examination    Vitals: BP (!) 158/76 (BP Location: Right arm, Patient Position: Sitting, Cuff Size: Adult Regular)   Pulse 66   Resp 16   Ht 1.588 m (5' 2.5\")   Wt 67 kg (147 lb 12.8 oz)   BMI 26.60 kg/m      General: Well appearing, in no acute distress. Resting comfortably in exam chair  Skin: No clubbing, no cyanosis.  Eyes: Extra ocular movements intact  Neck: No jugular venous distention, no carotid bruits, carotids have a normal upstroke  Lungs: Clear to auscultation bilaterally, no wheezing or rhonchi.  Heart: Regular rhythm, PMI not displaced, S1, S2 normal, no S3, no S4, no heaves, no rub and no murmur.  Abdomen: Soft, nontender  Extremities: No peripheral edema . Grade 2/4 distal pulses bilaterally.  Neuro: Oriented to person, place and time, alert, cooperative, gait coordinated.    BP Readings from Last 3 Encounters:   02/27/25 (!) 158/76   10/29/24 128/78   09/17/24 (!) 159/83       Pulse Readings from Last 3 Encounters:   02/27/25 66   09/17/24 74   08/19/24 83       Wt Readings from Last 3 Encounters:   02/27/25 67 kg (147 lb 12.8 oz)   09/20/24 65.3 kg (144 lb)   09/17/24 65.3 kg (144 lb)         Review of Systems      Please refer above for cardiac ROS details.       History     MEDICAL HISTORY:  Past Medical History:   Diagnosis Date    Anxiety state     " Coronary artery disease     coronary artery calcification    Hyperlipidemia     Hypertension     Hyponatremia 2023    Insomnia     used essential oils, meditation and is doing fine now.    Menorrhagia     iud helped, now not an issue due to menopause.    RUQ abdominal pain 2019    Status epilepticus (H)      SURGICAL HISTORY  Past Surgical History:   Procedure Laterality Date    CATARACT EXTRACTION       SECTION      right wrist fracture Right     WISDOM TOOTH EXTRACTION      [facility]  DELIVERY ONLY      Description:  Section;  Recorded: 2009;    [facility]  DELIVERY ONLY      Description:  Section;  Recorded: 2009;      FAMILY HISTORY:  Family History   Problem Relation Age of Onset    Hypertension Mother     Colon Cancer Mother         colon     Cancer Mother     Atrial fibrillation Mother     Cerebrovascular Disease Father 62        carotid endarterectomy    Coronary Artery Disease Father 58        CABG    Peripheral Vascular Disease Father     Hypertension Father     Gout Father     Lung Cancer Father         lung    Breast Cancer Father     Heart Disease Father     Alzheimer Disease Maternal Grandmother     Heart Disease Paternal Grandmother     Coronary Artery Disease Brother 66        3 stents placed    No Known Problems Brother     No Known Problems Brother     Asthma Sister     Breast Cancer Sister     Hypertension Sister     Hypertension Son     Substance Abuse Son     No Known Problems Son     Breast Cancer Maternal Aunt         Age in early 50's    Skin Cancer No family hx of     Osteoporosis No family hx of     Abnormal EKG No family hx of     FAMILY HISTORY:  Family History   Problem Relation Age of Onset    Hypertension Mother     Colon Cancer Mother         colon     Cancer Mother     Atrial fibrillation Mother     Cerebrovascular Disease Father 62        carotid endarterectomy    Coronary Artery Disease Father 58        CABG    Peripheral Vascular  Disease Father     Hypertension Father     Gout Father     Lung Cancer Father         lung    Breast Cancer Father     Heart Disease Father     Alzheimer Disease Maternal Grandmother     Heart Disease Paternal Grandmother     Coronary Artery Disease Brother 66        3 stents placed    No Known Problems Brother     No Known Problems Brother     Asthma Sister     Breast Cancer Sister     Hypertension Sister     Hypertension Son     Substance Abuse Son     No Known Problems Son     Breast Cancer Maternal Aunt         Age in early 50's    Skin Cancer No family hx of     Osteoporosis No family hx of     Abnormal EKG No family hx of       SOCIAL HISTORY:  Social History     Socioeconomic History    Marital status:      Spouse name: Not on file    Number of children: Not on file    Years of education: Not on file    Highest education level: Not on file   Occupational History    Not on file   Tobacco Use    Smoking status: Former     Current packs/day: 0.00     Average packs/day: 0.5 packs/day for 18.0 years (9.0 ttl pk-yrs)     Types: Cigarettes     Start date:      Quit date:      Years since quittin.1    Smokeless tobacco: Never   Vaping Use    Vaping status: Never Used   Substance and Sexual Activity    Alcohol use: Yes     Alcohol/week: 0.0 standard drinks of alcohol     Comment: Alcoholic Drinks/day: 3-4 drinks per week     Drug use: Never    Sexual activity: Yes     Partners: Male     Birth control/protection: Post-menopausal   Other Topics Concern    Not on file   Social History Narrative    2019  to [employer] for 37.5 years. Went to [place] with friend for lobsterfest recently. New granddaughter, works at [employer].     9/15/2020 still doing good with marriage. Still works at [employer].      Social Drivers of Health     Financial Resource Strain: Low Risk  (2023)    Financial Resource Strain     Within the past 12 months, have you or  your family members you live with been unable to get utilities (heat, electricity) when it was really needed?: No   Food Insecurity: Low Risk  (12/27/2023)    Food Insecurity     Within the past 12 months, did you worry that your food would run out before you got money to buy more?: No     Within the past 12 months, did the food you bought just not last and you didn t have money to get more?: No   Transportation Needs: Low Risk  (12/27/2023)    Transportation Needs     Within the past 12 months, has lack of transportation kept you from medical appointments, getting your medicines, non-medical meetings or appointments, work, or from getting things that you need?: No   Physical Activity: Not on file   Stress: Not on file   Social Connections: Not on file   Interpersonal Safety: Low Risk  (9/17/2024)    Interpersonal Safety     Do you feel physically and emotionally safe where you currently live?: Yes     Within the past 12 months, have you been hit, slapped, kicked or otherwise physically hurt by someone?: No     Within the past 12 months, have you been humiliated or emotionally abused in other ways by your partner or ex-partner?: No   Housing Stability: Low Risk  (12/27/2023)    Housing Stability     Do you have housing? : Yes     Are you worried about losing your housing?: No    ALLERGIES:  Allergies   Allergen Reactions    Wellbutrin [Bupropion] Unknown     Seizure            Medications:     Current Outpatient Medications   Medication Sig Dispense Refill    amLODIPine (NORVASC) 10 MG tablet TAKE ONE TABLET BY MOUTH ONCE DAILY 30 tablet 5    aspirin 81 mg chewable tablet [ASPIRIN 81 MG CHEWABLE TABLET] Chew 81 mg daily.      cholecalciferol, vitamin D3, (VITAMIN D3) 2,000 unit cap [CHOLECALCIFEROL, VITAMIN D3, (VITAMIN D3) 2,000 UNIT CAP] Take 1 capsule by mouth daily.      evolocumab (REPATHA) 140 MG/ML prefilled autoinjector Inject 1 mL (140 mg) subcutaneously every 14 days. 1 mL 11    fish oil-omega-3 fatty  acids 1000 MG capsule Take 2,000 mg by mouth daily      hydrochlorothiazide (HYDRODIURIL) 25 MG tablet TAKE ONE TABLET BY MOUTH ONCE DAILY 90 tablet 2    Lactobacillus rhamnosus GG (CULTURELLE) 10-15 Billion cell capsule [LACTOBACILLUS RHAMNOSUS GG (CULTURELLE) 10-15 BILLION CELL CAPSULE] Take 1 capsule by mouth daily.      losartan (COZAAR) 100 MG tablet Take 1 tablet (100 mg) by mouth daily. 90 tablet 3    multivitamin with minerals (THERA-M) 9 mg iron-400 mcg Tab tablet [MULTIVITAMIN WITH MINERALS (THERA-M) 9 MG IRON-400 MCG TAB TABLET] Take 1 tablet by mouth daily.             Shahab Hodgson PA-C  February 27, 2025    This note was partially generated using Dragon voice recognition system, and there may be some incorrect words,  spellings, and punctuation that were not noted in checking the note before saving.

## 2025-02-27 ENCOUNTER — OFFICE VISIT (OUTPATIENT)
Dept: CARDIOLOGY | Facility: CLINIC | Age: 65
End: 2025-02-27
Payer: COMMERCIAL

## 2025-02-27 VITALS
DIASTOLIC BLOOD PRESSURE: 76 MMHG | WEIGHT: 147.8 LBS | RESPIRATION RATE: 16 BRPM | BODY MASS INDEX: 26.19 KG/M2 | HEART RATE: 66 BPM | HEIGHT: 63 IN | SYSTOLIC BLOOD PRESSURE: 158 MMHG

## 2025-02-27 DIAGNOSIS — I25.10 CORONARY ARTERY CALCIFICATION SEEN ON CT SCAN: ICD-10-CM

## 2025-02-27 DIAGNOSIS — I10 BENIGN ESSENTIAL HYPERTENSION: Primary | ICD-10-CM

## 2025-02-27 DIAGNOSIS — E78.2 MIXED HYPERLIPIDEMIA: ICD-10-CM

## 2025-02-27 LAB
ALBUMIN SERPL BCG-MCNC: 4.8 G/DL (ref 3.5–5.2)
ALP SERPL-CCNC: 69 U/L (ref 40–150)
ALT SERPL W P-5'-P-CCNC: 27 U/L (ref 0–50)
ANION GAP SERPL CALCULATED.3IONS-SCNC: 11 MMOL/L (ref 7–15)
AST SERPL W P-5'-P-CCNC: 28 U/L (ref 0–45)
BILIRUB SERPL-MCNC: 0.5 MG/DL
BUN SERPL-MCNC: 21.6 MG/DL (ref 8–23)
CALCIUM SERPL-MCNC: 10 MG/DL (ref 8.8–10.4)
CHLORIDE SERPL-SCNC: 101 MMOL/L (ref 98–107)
CREAT SERPL-MCNC: 0.82 MG/DL (ref 0.51–0.95)
EGFRCR SERPLBLD CKD-EPI 2021: 79 ML/MIN/1.73M2
GLUCOSE SERPL-MCNC: 104 MG/DL (ref 70–99)
HCO3 SERPL-SCNC: 26 MMOL/L (ref 22–29)
POTASSIUM SERPL-SCNC: 3.4 MMOL/L (ref 3.4–5.3)
PROT SERPL-MCNC: 7.6 G/DL (ref 6.4–8.3)
SODIUM SERPL-SCNC: 138 MMOL/L (ref 135–145)

## 2025-02-27 RX ORDER — SPIRONOLACTONE 25 MG/1
25 TABLET ORAL DAILY
Qty: 90 TABLET | Refills: 3 | Status: SHIPPED | OUTPATIENT
Start: 2025-02-27

## 2025-02-27 NOTE — PATIENT INSTRUCTIONS
It was great to see you today! Your care team includes myself and Dr. Collier.    Recommendations:  Labwork today to check your kidney function, potassium levels, electrolytes, liver function.  These results should be back by the end of the day or tomorrow at the latest.  Start spironolactone 25mg once per day.  Stop amlodipine   Monitor your blood pressure at home, once per day, about 1-2 hours after taking your morning medications.  Please allow at least 5 minutes of rest prior to checking your blood pressure.  Alternatively, you can take three blood pressure readings ~5 minutes apart and average them.  Keep a home blood pressure log and send us a mychart in about 2 weeks     Follow up with us in 1 year.      If you have questions, concerns, or new concerning symptoms prior to your next appointment, please contact the Cardiology Nursing team at [phone].    For scheduling issues/changes, please call [phone].

## 2025-02-27 NOTE — LETTER
2/27/2025    Claudette Nowak MD  [address]  [facility] [address]    RE: Maral Marino       Dear Colleague,     I had the pleasure of seeing aMral Marino in the [facility].  HEART CARE ENCOUNTER NOTE      [facility]  [phone]    Primary Care: Claudette Nowak  Primary Cardiologist: Dr. Collier    Assessment/Recommendations   Assessment:  Coronary artery calcification  CT coronary calcium score 368  Stress echo negative for ischemia in May 2023  Denies anginal symptoms  On aspirin and Repatha  Dyslipidemia  Intolerance to statins  Significant improvement in LDL on Repatha  Hypertension  Uncontrolled, SBP typically 150s  Has been off amlodipine due to leg swelling at 10 mg dose  Discussed trial of amlodipine at 5 mg dosage versus starting spironolactone, patient prefers to avoid amlodipine at this time  Family history of premature coronary artery disease  Father with bypass surgery in his 50s    Plan:  CMP today  Stop amlodipine  Start spironolactone 25 mg daily  Repeat BMP 1 week after starting spironolactone  Monitor BP at home, Regis antonio in 2 weeks with updates  Continue Repatha    Follow up with Dr. Collier in 1 year or sooner if needed.      The longitudinal plan of care for the diagnosis(es)/condition(s) as documented were addressed during this visit. Due to the added complexity in care, I will continue to support Maral in the subsequent management and with ongoing continuity of care.      History of Present Illness/Subjective     Maral Marino is a 64 year old female with PMHx of coronary artery calcifications, dyslipidemia, hypertension, family history of premature CAD presenting for follow-up.      She was last seen by Dr. Collier on 2/26/2024.  At that time, she was establishing care and noted to have intolerances to statin therapy with poorly controlled LDL and poorly controlled hypertension.  She was recommended to start Repatha and increase losartan  "to 100 mg daily.  She was asked to follow-up in 1 year.    SBP remained uncontrolled and amlodipine was increased to 10 mg.  However this resulted in peripheral edema and was discontinued.  SBP now in the 150s.  Otherwise no cardiac complaints and feeling well.      Cardiac Testing         Echo stress test, 5/26/2023:  1. Normal stress echocardiogram without evidence of stress induced ischemia.  2. Normal resting LV systolic performance with an ejection fraction of 60%.  There is normal improvement in left ventricular systolic performance with a  peak ejection fraction of 75%.  3. No ECG evidence of ischemia.  4. No anginal chest pain reported with exercise.  5. Good functional capacity for age.      CT coronary calcium score 1/31/2022        Labs:  LDL 55  Potassium 3.6  Creatinine 0.74  A1c 5.2  Hemoglobin 13  Platelet 269      Physical Examination    Vitals: BP (!) 158/76 (BP Location: Right arm, Patient Position: Sitting, Cuff Size: Adult Regular)   Pulse 66   Resp 16   Ht 1.588 m (5' 2.5\")   Wt 67 kg (147 lb 12.8 oz)   BMI 26.60 kg/m      General: Well appearing, in no acute distress. Resting comfortably in exam chair  Skin: No clubbing, no cyanosis.  Eyes: Extra ocular movements intact  Neck: No jugular venous distention, no carotid bruits, carotids have a normal upstroke  Lungs: Clear to auscultation bilaterally, no wheezing or rhonchi.  Heart: Regular rhythm, PMI not displaced, S1, S2 normal, no S3, no S4, no heaves, no rub and no murmur.  Abdomen: Soft, nontender  Extremities: No peripheral edema . Grade 2/4 distal pulses bilaterally.  Neuro: Oriented to person, place and time, alert, cooperative, gait coordinated.    BP Readings from Last 3 Encounters:   02/27/25 (!) 158/76   10/29/24 128/78   09/17/24 (!) 159/83       Pulse Readings from Last 3 Encounters:   02/27/25 66   09/17/24 74   08/19/24 83       Wt Readings from Last 3 Encounters:   02/27/25 67 kg (147 lb 12.8 oz)   09/20/24 65.3 kg (144 lb) "   24 65.3 kg (144 lb)         Review of Systems      Please refer above for cardiac ROS details.       History     MEDICAL HISTORY:  Past Medical History:   Diagnosis Date     Anxiety state      Coronary artery disease     coronary artery calcification     Hyperlipidemia      Hypertension      Hyponatremia 2023     Insomnia     used essential oils, meditation and is doing fine now.     Menorrhagia     iud helped, now not an issue due to menopause.     RUQ abdominal pain 2019     Status epilepticus (H)      SURGICAL HISTORY  Past Surgical History:   Procedure Laterality Date     CATARACT EXTRACTION        SECTION       right wrist fracture Right      WISDOM TOOTH EXTRACTION       [facility]  DELIVERY ONLY      Description:  Section;  Recorded: 2009;     [facility]  DELIVERY ONLY      Description:  Section;  Recorded: 2009;      FAMILY HISTORY:  Family History   Problem Relation Age of Onset     Hypertension Mother      Colon Cancer Mother         colon      Cancer Mother      Atrial fibrillation Mother      Cerebrovascular Disease Father 62        carotid endarterectomy     Coronary Artery Disease Father 58        CABG     Peripheral Vascular Disease Father      Hypertension Father      Gout Father      Lung Cancer Father         lung     Breast Cancer Father      Heart Disease Father      Alzheimer Disease Maternal Grandmother      Heart Disease Paternal Grandmother      Coronary Artery Disease Brother 66        3 stents placed     No Known Problems Brother      No Known Problems Brother      Asthma Sister      Breast Cancer Sister      Hypertension Sister      Hypertension Son      Substance Abuse Son      No Known Problems Son      Breast Cancer Maternal Aunt         Age in early 50's     Skin Cancer No family hx of      Osteoporosis No family hx of      Abnormal EKG No family hx of     FAMILY HISTORY:  Family History   Problem Relation Age of Onset      Hypertension Mother      Colon Cancer Mother         colon      Cancer Mother      Atrial fibrillation Mother      Cerebrovascular Disease Father 62        carotid endarterectomy     Coronary Artery Disease Father 58        CABG     Peripheral Vascular Disease Father      Hypertension Father      Gout Father      Lung Cancer Father         lung     Breast Cancer Father      Heart Disease Father      Alzheimer Disease Maternal Grandmother      Heart Disease Paternal Grandmother      Coronary Artery Disease Brother 66        3 stents placed     No Known Problems Brother      No Known Problems Brother      Asthma Sister      Breast Cancer Sister      Hypertension Sister      Hypertension Son      Substance Abuse Son      No Known Problems Son      Breast Cancer Maternal Aunt         Age in early 50's     Skin Cancer No family hx of      Osteoporosis No family hx of      Abnormal EKG No family hx of       SOCIAL HISTORY:  Social History     Socioeconomic History     Marital status:      Spouse name: Not on file     Number of children: Not on file     Years of education: Not on file     Highest education level: Not on file   Occupational History     Not on file   Tobacco Use     Smoking status: Former     Current packs/day: 0.00     Average packs/day: 0.5 packs/day for 18.0 years (9.0 ttl pk-yrs)     Types: Cigarettes     Start date:      Quit date:      Years since quittin.1     Smokeless tobacco: Never   Vaping Use     Vaping status: Never Used   Substance and Sexual Activity     Alcohol use: Yes     Alcohol/week: 0.0 standard drinks of alcohol     Comment: Alcoholic Drinks/day: 3-4 drinks per week      Drug use: Never     Sexual activity: Yes     Partners: Male     Birth control/protection: Post-menopausal   Other Topics Concern     Not on file   Social History Narrative    2019  to [employer] for 37.5 years. Went to [place] with friend for lobsterfest recently. New  granddaughter, works at [employer].     9/15/2020 still doing good with marriage. Still works at [employer].      Social Drivers of Health     Financial Resource Strain: Low Risk  (12/27/2023)    Financial Resource Strain      Within the past 12 months, have you or your family members you live with been unable to get utilities (heat, electricity) when it was really needed?: No   Food Insecurity: Low Risk  (12/27/2023)    Food Insecurity      Within the past 12 months, did you worry that your food would run out before you got money to buy more?: No      Within the past 12 months, did the food you bought just not last and you didn t have money to get more?: No   Transportation Needs: Low Risk  (12/27/2023)    Transportation Needs      Within the past 12 months, has lack of transportation kept you from medical appointments, getting your medicines, non-medical meetings or appointments, work, or from getting things that you need?: No   Physical Activity: Not on file   Stress: Not on file   Social Connections: Not on file   Interpersonal Safety: Low Risk  (9/17/2024)    Interpersonal Safety      Do you feel physically and emotionally safe where you currently live?: Yes      Within the past 12 months, have you been hit, slapped, kicked or otherwise physically hurt by someone?: No      Within the past 12 months, have you been humiliated or emotionally abused in other ways by your partner or ex-partner?: No   Housing Stability: Low Risk  (12/27/2023)    Housing Stability      Do you have housing? : Yes      Are you worried about losing your housing?: No    ALLERGIES:  Allergies   Allergen Reactions     Wellbutrin [Bupropion] Unknown     Seizure            Medications:     Current Outpatient Medications   Medication Sig Dispense Refill     amLODIPine (NORVASC) 10 MG tablet TAKE ONE TABLET BY MOUTH ONCE DAILY 30 tablet 5     aspirin 81 mg chewable tablet [ASPIRIN 81 MG CHEWABLE TABLET] Chew 81 mg  daily.       cholecalciferol, vitamin D3, (VITAMIN D3) 2,000 unit cap [CHOLECALCIFEROL, VITAMIN D3, (VITAMIN D3) 2,000 UNIT CAP] Take 1 capsule by mouth daily.       evolocumab (REPATHA) 140 MG/ML prefilled autoinjector Inject 1 mL (140 mg) subcutaneously every 14 days. 1 mL 11     fish oil-omega-3 fatty acids 1000 MG capsule Take 2,000 mg by mouth daily       hydrochlorothiazide (HYDRODIURIL) 25 MG tablet TAKE ONE TABLET BY MOUTH ONCE DAILY 90 tablet 2     Lactobacillus rhamnosus GG (CULTURELLE) 10-15 Billion cell capsule [LACTOBACILLUS RHAMNOSUS GG (CULTURELLE) 10-15 BILLION CELL CAPSULE] Take 1 capsule by mouth daily.       losartan (COZAAR) 100 MG tablet Take 1 tablet (100 mg) by mouth daily. 90 tablet 3     multivitamin with minerals (THERA-M) 9 mg iron-400 mcg Tab tablet [MULTIVITAMIN WITH MINERALS (THERA-M) 9 MG IRON-400 MCG TAB TABLET] Take 1 tablet by mouth daily.             Shahab Hodgson PA-C  February 27, 2025    This note was partially generated using Dragon voice recognition system, and there may be some incorrect words,  spellings, and punctuation that were not noted in checking the note before saving.      Thank you for allowing me to participate in the care of your patient.      Sincerely,     Shahab Hodgson PA-C     [facility]  cc:   Ivon Collier MD  [address]  [address]  [place] [address]

## 2025-04-10 ENCOUNTER — LAB (OUTPATIENT)
Dept: LAB | Facility: CLINIC | Age: 65
End: 2025-04-10
Payer: COMMERCIAL

## 2025-04-10 DIAGNOSIS — I10 BENIGN ESSENTIAL HYPERTENSION: ICD-10-CM

## 2025-04-10 LAB
ANION GAP SERPL CALCULATED.3IONS-SCNC: 13 MMOL/L (ref 7–15)
BUN SERPL-MCNC: 21.4 MG/DL (ref 8–23)
CALCIUM SERPL-MCNC: 10.2 MG/DL (ref 8.8–10.4)
CHLORIDE SERPL-SCNC: 99 MMOL/L (ref 98–107)
CREAT SERPL-MCNC: 0.82 MG/DL (ref 0.51–0.95)
EGFRCR SERPLBLD CKD-EPI 2021: 79 ML/MIN/1.73M2
GLUCOSE SERPL-MCNC: 100 MG/DL (ref 70–99)
HCO3 SERPL-SCNC: 24 MMOL/L (ref 22–29)
POTASSIUM SERPL-SCNC: 4.4 MMOL/L (ref 3.4–5.3)
SODIUM SERPL-SCNC: 136 MMOL/L (ref 135–145)

## 2025-04-10 PROCEDURE — 80048 BASIC METABOLIC PNL TOTAL CA: CPT

## 2025-04-10 PROCEDURE — 36415 COLL VENOUS BLD VENIPUNCTURE: CPT

## 2025-05-06 ENCOUNTER — MYC REFILL (OUTPATIENT)
Dept: CARDIOLOGY | Facility: CLINIC | Age: 65
End: 2025-05-06
Payer: COMMERCIAL

## 2025-05-06 ENCOUNTER — MYC REFILL (OUTPATIENT)
Dept: FAMILY MEDICINE | Facility: CLINIC | Age: 65
End: 2025-05-06
Payer: COMMERCIAL

## 2025-05-06 DIAGNOSIS — I10 BENIGN ESSENTIAL HYPERTENSION: ICD-10-CM

## 2025-05-06 DIAGNOSIS — I25.10 CORONARY ARTERY CALCIFICATION SEEN ON CT SCAN: ICD-10-CM

## 2025-05-06 DIAGNOSIS — E78.2 MIXED HYPERLIPIDEMIA: ICD-10-CM

## 2025-05-06 RX ORDER — HYDROCHLOROTHIAZIDE 25 MG/1
25 TABLET ORAL DAILY
Qty: 90 TABLET | Refills: 0 | Status: SHIPPED | OUTPATIENT
Start: 2025-05-06

## 2025-05-06 RX ORDER — HYDROCHLOROTHIAZIDE 25 MG/1
25 TABLET ORAL DAILY
Qty: 90 TABLET | Refills: 0 | OUTPATIENT
Start: 2025-05-06

## 2025-05-06 RX ORDER — SPIRONOLACTONE 25 MG/1
25 TABLET ORAL DAILY
Qty: 90 TABLET | Refills: 1 | Status: SHIPPED | OUTPATIENT
Start: 2025-05-06

## 2025-05-06 RX ORDER — LOSARTAN POTASSIUM 100 MG/1
100 TABLET ORAL DAILY
Qty: 90 TABLET | Refills: 2 | Status: SHIPPED | OUTPATIENT
Start: 2025-05-06

## 2025-05-06 RX ORDER — LOSARTAN POTASSIUM 100 MG/1
100 TABLET ORAL DAILY
Qty: 90 TABLET | Refills: 2 | OUTPATIENT
Start: 2025-05-06

## 2025-05-12 ENCOUNTER — TELEPHONE (OUTPATIENT)
Dept: CARDIOLOGY | Facility: CLINIC | Age: 65
End: 2025-05-12
Payer: COMMERCIAL

## 2025-05-13 NOTE — TELEPHONE ENCOUNTER
Retail Pharmacy Prior Authorization Team   Phone: [phone]    PA Initiation    Medication: Repatha  Insurance Company: [employer] ([facility]) - Phone [phone] Fax [phone]  Pharmacy Filling the Rx: [facility] - [place], [place] - [address] [facility]  Filling Pharmacy Phone: [phone]  Filling Pharmacy Fax: [phone]  Start Date: 5/13/2025

## 2025-05-14 NOTE — TELEPHONE ENCOUNTER
Prior Authorization Approval    Authorization Effective Date: 5/13/2025  Authorization Expiration Date: 11/13/2025  Medication: Repatha - APPROVED  Approved Dose/Quantity:    Reference #:     Insurance Company: [employer] ([facility]) - Phone [phone] Fax [phone]  Expected CoPay:       CoPay Card Available:      Foundation Assistance Needed:    Which Pharmacy is filling the prescription (Not needed for infusion/clinic administered): [facility] - [place], [place] - [address]  Pharmacy Notified:  YES  Patient Notified:  YES

## 2025-05-24 ENCOUNTER — HEALTH MAINTENANCE LETTER (OUTPATIENT)
Age: 65
End: 2025-05-24

## 2025-09-03 ENCOUNTER — MYC REFILL (OUTPATIENT)
Dept: CARDIOLOGY | Facility: CLINIC | Age: 65
End: 2025-09-03
Payer: COMMERCIAL

## 2025-09-03 DIAGNOSIS — I10 BENIGN ESSENTIAL HYPERTENSION: ICD-10-CM

## 2025-09-03 RX ORDER — LOSARTAN POTASSIUM 100 MG/1
100 TABLET ORAL DAILY
Qty: 90 TABLET | Refills: 2 | Status: SHIPPED | OUTPATIENT
Start: 2025-09-03

## 2025-09-04 ENCOUNTER — TELEPHONE (OUTPATIENT)
Dept: FAMILY MEDICINE | Facility: CLINIC | Age: 65
End: 2025-09-04
Payer: COMMERCIAL

## 2025-09-04 RX ORDER — SPIRONOLACTONE 25 MG/1
25 TABLET ORAL DAILY
Qty: 90 TABLET | Refills: 0 | Status: SHIPPED | OUTPATIENT
Start: 2025-09-04